# Patient Record
Sex: FEMALE | Race: WHITE | NOT HISPANIC OR LATINO | Employment: UNEMPLOYED | URBAN - NONMETROPOLITAN AREA
[De-identification: names, ages, dates, MRNs, and addresses within clinical notes are randomized per-mention and may not be internally consistent; named-entity substitution may affect disease eponyms.]

---

## 2018-11-01 ENCOUNTER — OFFICE VISIT (OUTPATIENT)
Dept: FAMILY MEDICINE | Facility: OTHER | Age: 23
End: 2018-11-01

## 2018-11-01 ENCOUNTER — APPOINTMENT (OUTPATIENT)
Dept: OCCUPATIONAL MEDICINE | Facility: OTHER | Age: 23
End: 2018-11-01

## 2018-11-01 DIAGNOSIS — Z02.1 PRE-EMPLOYMENT EXAMINATION: Primary | ICD-10-CM

## 2018-11-01 PROCEDURE — 99499 UNLISTED E&M SERVICE: CPT

## 2018-11-01 NOTE — MR AVS SNAPSHOT
After Visit Summary   11/1/2018    Ambreen Flores    MRN: [id number]           Patient Information     Date Of Birth          1995        Visit Information        Provider Department      11/1/2018 10:00 AM Larry Rooney MD [facility]aut[facility]        Today's Diagnoses     Pre-employment examination    -  1       Follow-ups after your visit        Who to contact     If you have questions or need follow up information about today's clinic visit or your schedule please contact [facility] directly at [phone].  Normal or non-critical lab and imaging results will be communicated to you by MyChart, letter or phone within 4 business days after the clinic has received the results. If you do not hear from us within 7 days, please contact the clinic through MyChart or phone. If you have a critical or abnormal lab result, we will notify you by phone as soon as possible.  Submit refill requests through [email or web address] or call your pharmacy and they will forward the refill request to us. Please allow 3 business days for your refill to be completed.          Additional Information About Your Visit        Care EveryWhere ID     This is your Care EveryWhere ID. This could be used by other organizations to access your [facility] medical records  [id number]         Blood Pressure from Last 3 Encounters:   No data found for BP    Weight from Last 3 Encounters:   No data found for Wt              Today, you had the following     No orders found for display       Primary Care Provider    None Specified       No primary provider on file.        Equal Access to Services     [facility] ELIANA : Hadii omega noriegao Sojose, waaxda luqadaha, qaybta kaalmada anastasiya, bee travis. So [facility] [phone].    ATENCIÓN: Si habla español, tiene a red disposición servicios gratuitos de asistencia lingüística. Llame al [phone].    We comply with applicable federal  civil rights laws and [place] laws. We do not discriminate on the basis of race, color, national origin, age, disability, sex, sexual orientation, or gender identity.            Thank you!     Thank you for choosing [facility]  for your care. Our goal is always to provide you with excellent care. Hearing back from our patients is one way we can continue to improve our services. Please take a few minutes to complete the written survey that you may receive in the mail after your visit with us. Thank you!             Your Updated Medication List - Protect others around you: Learn how to safely use, store and throw away your medicines at www.disposemymeds.org.      Notice  As of 11/1/2018 11:29 AM    You have not been prescribed any medications.

## 2019-01-14 ENCOUNTER — OFFICE VISIT (OUTPATIENT)
Dept: CHIROPRACTIC MEDICINE | Facility: OTHER | Age: 24
End: 2019-01-14
Attending: CHIROPRACTOR
Payer: OTHER GOVERNMENT

## 2019-01-14 DIAGNOSIS — M99.01 SEGMENTAL AND SOMATIC DYSFUNCTION OF CERVICAL REGION: ICD-10-CM

## 2019-01-14 DIAGNOSIS — M54.50 ACUTE BILATERAL LOW BACK PAIN WITHOUT SCIATICA: ICD-10-CM

## 2019-01-14 DIAGNOSIS — M99.02 SEGMENTAL AND SOMATIC DYSFUNCTION OF THORACIC REGION: ICD-10-CM

## 2019-01-14 DIAGNOSIS — M99.03 SEGMENTAL AND SOMATIC DYSFUNCTION OF LUMBAR REGION: Primary | ICD-10-CM

## 2019-01-14 PROCEDURE — 98941 CHIROPRACT MANJ 3-4 REGIONS: CPT | Mod: AT | Performed by: CHIROPRACTOR

## 2019-01-14 NOTE — PROGRESS NOTES
Subjective Finding:    Chief compalint: Patient presents with:  Back Pain: from lifting  Neck Pain  , Pain Scale: 5/10, Intensity: sharp, Duration: 3 weeks, Radiating: no.    Date of injury:     Activities that the pain restricts:   Home/household/hobbies/social activities: yes.  Work duties: yes.  Sleep: no.  Makes symptoms better: rest.  Makes symptoms worse: lumbar extension and lumbar flexion.  Have you seen anyone else for the symptoms? No.  Work related: no.  Automobile related injury: no.    Objective and Assessment:    Posture Analysis:   High shoulder: .  Head tilt: .  High iliac crest: .  Head carriage: neutral.  Thoracic Kyphosis: neutral.  Lumbar Lordosis: forward.    Lumbar Range of Motion: extension decreased.  Cervical Range of Motion: .  Thoracic Range of Motion: .  Extremity Range of Motion: .    Palpation:   Quad lumb: bilateral, referred pain: no    Segmental dysfunction pre-treatment and treatment area: C4, T4, T6, L4 and L5.    Assessment post-treatment:  Cervical: ROM increased.  Thoracic: ROM increased.  Lumbar: ROM increased.    Comments: scoliotic curve in T spine.      Complicating Factors: structural abnormalities.    Procedure(s):  CMT:  11703 Chiropractic manipulative treatment 3-4 regions performed   Cervical: Diversified, See above for level, Supine, Thoracic: Diversified, See above for level, Prone and Lumbar: Diversified, See above for level, Side posture    Modalities:  None performed this visit    Therapeutic procedures:  None    Plan:  Treatment plan: 2 times per week for 1 weeks.  Instructed patient: stretch as instructed at visit.  Short term goals: increase ROM.  Long term goals: restore normal function.  Prognosis: good.

## 2019-01-28 ENCOUNTER — OFFICE VISIT (OUTPATIENT)
Dept: CHIROPRACTIC MEDICINE | Facility: OTHER | Age: 24
End: 2019-01-28
Attending: CHIROPRACTOR
Payer: OTHER GOVERNMENT

## 2019-01-28 DIAGNOSIS — M99.03 SEGMENTAL AND SOMATIC DYSFUNCTION OF LUMBAR REGION: Primary | ICD-10-CM

## 2019-01-28 DIAGNOSIS — M99.01 SEGMENTAL AND SOMATIC DYSFUNCTION OF CERVICAL REGION: ICD-10-CM

## 2019-01-28 DIAGNOSIS — M99.02 SEGMENTAL AND SOMATIC DYSFUNCTION OF THORACIC REGION: ICD-10-CM

## 2019-01-28 DIAGNOSIS — M54.50 ACUTE BILATERAL LOW BACK PAIN WITHOUT SCIATICA: ICD-10-CM

## 2019-01-28 PROCEDURE — 98941 CHIROPRACT MANJ 3-4 REGIONS: CPT | Mod: AT | Performed by: CHIROPRACTOR

## 2019-01-28 NOTE — PROGRESS NOTES
Subjective Finding:    Chief compalint: Patient presents with:  Back Pain  , Pain Scale: 5/10, Intensity: sharp, Duration: 3 weeks, Radiating: no.    Date of injury:     Activities that the pain restricts:   Home/household/hobbies/social activities: yes.  Work duties: yes.  Sleep: no.  Makes symptoms better: rest.  Makes symptoms worse: lumbar extension and lumbar flexion.  Have you seen anyone else for the symptoms? No.  Work related: no.  Automobile related injury: no.    Objective and Assessment:    Posture Analysis:   High shoulder: .  Head tilt: .  High iliac crest: .  Head carriage: neutral.  Thoracic Kyphosis: neutral.  Lumbar Lordosis: forward.    Lumbar Range of Motion: extension decreased.  Cervical Range of Motion: .  Thoracic Range of Motion: .  Extremity Range of Motion: .    Palpation:   Quad lumb: bilateral, referred pain: no    Segmental dysfunction pre-treatment and treatment area: C4, T4, T6, L4 and L5.    Assessment post-treatment:  Cervical: ROM increased.  Thoracic: ROM increased.  Lumbar: ROM increased.    Comments: scoliotic curve in T spine.      Complicating Factors: structural abnormalities.    Procedure(s):  CMT:  83610 Chiropractic manipulative treatment 3-4 regions performed   Cervical: Diversified, See above for level, Supine, Thoracic: Diversified, See above for level, Prone and Lumbar: Diversified, See above for level, Side posture    Modalities:  None performed this visit    Therapeutic procedures:  None    Plan:  Treatment plan: 2 times per week for 1 weeks.  Instructed patient: stretch as instructed at visit.  Short term goals: increase ROM.  Long term goals: restore normal function.  Prognosis: good.

## 2019-02-13 ENCOUNTER — HOSPITAL ENCOUNTER (OUTPATIENT)
Dept: ULTRASOUND IMAGING | Facility: HOSPITAL | Age: 24
End: 2019-02-13
Attending: FAMILY MEDICINE
Payer: OTHER GOVERNMENT

## 2019-02-13 PROCEDURE — 76830 TRANSVAGINAL US NON-OB: CPT | Mod: TC

## 2019-02-13 PROCEDURE — 76705 ECHO EXAM OF ABDOMEN: CPT | Mod: TC

## 2019-02-15 ENCOUNTER — HEALTH MAINTENANCE LETTER (OUTPATIENT)
Age: 24
End: 2019-02-15

## 2020-01-01 ENCOUNTER — HOSPITAL ENCOUNTER (EMERGENCY)
Facility: HOSPITAL | Age: 25
Discharge: HOME | End: 2020-01-01
Attending: NURSE PRACTITIONER | Admitting: NURSE PRACTITIONER
Payer: OTHER GOVERNMENT

## 2020-01-01 VITALS
TEMPERATURE: 97.6 F | DIASTOLIC BLOOD PRESSURE: 85 MMHG | HEART RATE: 110 BPM | OXYGEN SATURATION: 98 % | SYSTOLIC BLOOD PRESSURE: 128 MMHG

## 2020-01-01 DIAGNOSIS — S61.419A HAND LACERATION: Primary | ICD-10-CM

## 2020-01-01 PROCEDURE — 12002 RPR S/N/AX/GEN/TRNK2.6-7.5CM: CPT | Mod: Z6 | Performed by: NURSE PRACTITIONER

## 2020-01-01 PROCEDURE — 12002 RPR S/N/AX/GEN/TRNK2.6-7.5CM: CPT

## 2020-01-01 PROCEDURE — 40000268 ZZH STATISTIC NO CHARGES

## 2020-01-01 RX ORDER — LEVONORGESTREL/ETHIN.ESTRADIOL 0.1-0.02MG
TABLET ORAL
Status: ON HOLD | COMMUNITY
Start: 2019-03-29 | End: 2021-01-21

## 2020-01-01 RX ORDER — SULFAMETHOXAZOLE AND TRIMETHOPRIM 400; 80 MG/1; MG/1
1 TABLET ORAL 2 TIMES DAILY
Status: ON HOLD | COMMUNITY
End: 2021-01-21

## 2020-01-01 RX ORDER — ARIPIPRAZOLE 5 MG/1
5 TABLET ORAL DAILY
Status: ON HOLD | COMMUNITY
End: 2021-01-21

## 2020-01-01 ASSESSMENT — ENCOUNTER SYMPTOMS: WOUND: 1

## 2020-01-01 NOTE — ED PROVIDER NOTES
History     Chief Complaint   Patient presents with     Laceration     HPI  Ambreen Flores is a 24 year old female who presents to  for a laceration to right hand and index finger. She was in a smoothie when she put her hand on top of the  not knowing it was on and accidentally got cut. She can't move index finger all the way. Last tetanus was 2018.      Allergies:  No Known Allergies    Problem List:    There are no active problems to display for this patient.       Past Medical History:    History reviewed. No pertinent past medical history.    Past Surgical History:    History reviewed. No pertinent surgical history.    Family History:    No family history on file.    Social History:  Marital Status:  Single [1]  Social History     Tobacco Use     Smoking status: None   Substance Use Topics     Alcohol use: None     Drug use: None        Medications:    ARIPiprazole (ABILIFY) 5 MG tablet  levonorgestrel-ethinyl estradiol (AVIANE/ALESSE/LESSINA) 0.1-20 MG-MCG tablet  sulfamethoxazole-trimethoprim (BACTRIM/SEPTRA) 400-80 MG tablet          Review of Systems   Skin: Positive for wound.   All other systems reviewed and are negative.      Physical Exam   BP: 128/85  Pulse: 110  Temp: 97.6  F (36.4  C)  SpO2: 98 %      Physical Exam  Vitals signs and nursing note reviewed.   Constitutional:       Appearance: She is not ill-appearing or toxic-appearing.   Cardiovascular:      Rate and Rhythm: Normal rate.   Pulmonary:      Effort: Pulmonary effort is normal.   Musculoskeletal:      Right hand: She exhibits laceration. She exhibits normal range of motion and normal capillary refill. Normal sensation noted. Normal strength noted.        Hands:       Comments: 1 inch laceration to right palmar hand radial side.  Half an inch laceration to right middle phalanx of pointer finger.   Skin:     Capillary Refill: Capillary refill takes less than 2 seconds.   Neurological:      Mental Status: She is alert and oriented  to person, place, and time.         ED Course        [facility]    -Laceration Repair  Date/Time: 1/1/2020 3:00 PM  Performed by: Mary Mccloud CNP  Authorized by: Mary Mccloud CNP     UNIVERSAL PROTOCOL   Site Marked: NA  Prior Images Obtained and Reviewed:  NA  Required items: Required blood products, implants, devices and special equipment available    Patient identity confirmed:  Verbally with patient and arm band  NA - No sedation, light sedation, or local anesthesia  Confirmation Checklist:  Patient's identity using two indicators, relevant allergies, procedure was appropriate and matched the consent or emergent situation and correct equipment/implants were available  Universal Protocol: the Joint Commission Universal Protocol was followed    Preparation: Patient was prepped and draped in usual sterile fashion          ANESTHESIA (see MAR for exact dosages):     Anesthesia method:  Local infiltration    Local anesthetic:  Lidocaine 1% w/o epi  LACERATION DETAILS     Location:  Hand    Hand location:  R palm    Length (cm):  2.5    Depth (mm):  10    REPAIR TYPE:     Repair type:  Simple      EXPLORATION:     Hemostasis achieved with:  Direct pressure    Wound exploration: entire depth of wound probed and visualized      Wound extent: fascia violated      Wound extent: no nerve damage, no tendon damage and no underlying fracture      TREATMENT:     Area cleansed with:  Saline and Hibiclens    Amount of cleaning:  Standard    Irrigation solution:  Sterile saline    Irrigation volume:  30    Irrigation method:  Pressure wash    Visualized foreign bodies/material removed: no      SKIN REPAIR     Repair method:  Sutures    Suture size:  4-0    Suture technique:  Simple interrupted    Number of sutures:  7    APPROXIMATION     Approximation:  Close    POST-PROCEDURE DETAILS     Dressing:  Open (no dressing)      PROCEDURE   Patient Tolerance:  Patient tolerated the procedure well with no immediate  complications    [facility]    -Laceration Repair  Date/Time: 1/1/2020 3:20 PM  Performed by: Mary Mccloud CNP  Authorized by: Mary Mccloud CNP       ANESTHESIA (see MAR for exact dosages):     Anesthesia method:  Local infiltration    Local anesthetic:  Lidocaine 1% w/o epi  LACERATION DETAILS     Location:  Finger    Finger location:  R index finger    Length (cm):  1.2    Depth (mm):  3    REPAIR TYPE:     Repair type:  Simple      EXPLORATION:     Hemostasis achieved with:  Direct pressure    Wound exploration: wound explored through full range of motion      Wound extent: no foreign body, no nerve damage, no tendon damage and no underlying fracture      TREATMENT:     Area cleansed with:  Saline and Hibiclens    Irrigation solution:  Sterile saline    Irrigation volume:  20    Irrigation method:  Pressure wash    Visualized foreign bodies/material removed: no      SKIN REPAIR     Repair method:  Sutures    Suture size:  4-0    Suture technique:  Simple interrupted    Number of sutures:  2    APPROXIMATION     Approximation:  Close  PROCEDURE   Patient Tolerance:  Patient tolerated the procedure well with no immediate complications               No results found for this or any previous visit (from the past 24 hour(s)).    Medications - No data to display    Assessments & Plan (with Medical Decision Making)   Hand laceration:  Pleasant 24-year-old female that presented to urgent care for a laceration to right hand after accidentally getting caught on a .  1 inch laceration to right palmar hand on the radial side and half an inch laceration to right middle phalanx of index finger.  Full range of motion to right index finger.  Full sensation to right index finger and hand.  Tetanus up-to-date as of 2018.  Plan:  Laceration repair done (see procedure note above).  Patient tolerated well.  Keep the laceration clean and dry.  Can apply triple antibiotic to the wound. Go to your doctor in 7  to 10 days to get stitches removed.   Return to emergency department for worsening or concerning symptoms.    I have reviewed the nursing notes.    I have reviewed the findings, diagnosis, plan and need for follow up with the patient.    Final diagnoses:   Hand laceration       1/1/2020   [facility]     Mary Mccloud, CNP  01/01/20 1600

## 2020-01-01 NOTE — DISCHARGE INSTRUCTIONS
Keep the laceration clean and dry.  Can apply triple antibiotic to the wound.    Go to your doctor in 7 to 10 days to get stitches removed.    Return to emergency department for worsening or concerning symptoms.

## 2020-01-01 NOTE — ED AVS SNAPSHOT
[facility]  [address] [address]  Phone:  [phone]                                    Ambreen Flores   MRN: [id number]    Department:  [facility]   Date of Visit:  1/1/2020           After Visit Summary Signature Page    I have received my discharge instructions, and my questions have been answered. I have discussed any challenges I see with this plan with the nurse or doctor.    ..........................................................................................................................................  Patient/Patient Representative Signature      ..........................................................................................................................................  Patient Representative Print Name and Relationship to Patient    ..................................................               ................................................  Date                                   Time    ..........................................................................................................................................  Reviewed by Signature/Title    ...................................................              ..............................................  Date                                               Time          22EPIC Rev 08/18

## 2020-03-11 ENCOUNTER — HEALTH MAINTENANCE LETTER (OUTPATIENT)
Age: 25
End: 2020-03-11

## 2020-08-10 ENCOUNTER — HOSPITAL ENCOUNTER (OUTPATIENT)
Dept: ULTRASOUND IMAGING | Facility: HOSPITAL | Age: 25
Discharge: HOME | End: 2020-08-10
Attending: FAMILY MEDICINE | Admitting: FAMILY MEDICINE
Payer: OTHER GOVERNMENT

## 2020-08-10 DIAGNOSIS — Z34.91 NORMAL PREGNANCY IN FIRST TRIMESTER: ICD-10-CM

## 2020-08-10 PROCEDURE — 76801 OB US < 14 WKS SINGLE FETUS: CPT | Mod: TC

## 2020-08-31 ENCOUNTER — TRANSFERRED RECORDS (OUTPATIENT)
Dept: HEALTH INFORMATION MANAGEMENT | Facility: HOSPITAL | Age: 25
End: 2020-08-31

## 2020-08-31 LAB — PAP-ABSTRACT: NORMAL

## 2020-10-02 ENCOUNTER — NURSE TRIAGE (OUTPATIENT)
Dept: FAMILY MEDICINE | Facility: OTHER | Age: 25
End: 2020-10-02

## 2020-10-02 ENCOUNTER — OFFICE VISIT (OUTPATIENT)
Dept: FAMILY MEDICINE | Facility: OTHER | Age: 25
End: 2020-10-02
Attending: FAMILY MEDICINE
Payer: OTHER GOVERNMENT

## 2020-10-02 DIAGNOSIS — R05.9 COUGH: ICD-10-CM

## 2020-10-02 DIAGNOSIS — R07.0 THROAT PAIN: Primary | ICD-10-CM

## 2020-10-02 DIAGNOSIS — R51.9 HEADACHE: ICD-10-CM

## 2020-10-02 PROCEDURE — U0003 INFECTIOUS AGENT DETECTION BY NUCLEIC ACID (DNA OR RNA); SEVERE ACUTE RESPIRATORY SYNDROME CORONAVIRUS 2 (SARS-COV-2) (CORONAVIRUS DISEASE [COVID-19]), AMPLIFIED PROBE TECHNIQUE, MAKING USE OF HIGH THROUGHPUT TECHNOLOGIES AS DESCRIBED BY CMS-2020-01-R: HCPCS | Performed by: FAMILY MEDICINE

## 2020-10-02 NOTE — TELEPHONE ENCOUNTER
"    Reason for Disposition    [1] COVID-19 infection suspected by caller or triager AND [2] mild symptoms (cough, fever, or others) AND [3] no complications or SOB    Answer Assessment - Initial Assessment Questions  1. COVID-19 DIAGNOSIS: \"Who made your Coronavirus (COVID-19) diagnosis?\" \"Was it confirmed by a positive lab test?\" If not diagnosed by a HCP, ask \"Are there lots of cases (community spread) where you live?\" (See public health department website, if unsure)      Yes possibly co-workers daughter is positive.  2. ONSET: \"When did the COVID-19 symptoms start?\"       yesterday  3. WORST SYMPTOM: \"What is your worst symptom?\" (e.g., cough, fever, shortness of breath, muscle aches)      Cough,   4. COUGH: \"Do you have a cough?\" If so, ask: \"How bad is the cough?\"        cough  5. FEVER: \"Do you have a fever?\" If so, ask: \"What is your temperature, how was it measured, and when did it start?\"      no  6. RESPIRATORY STATUS: \"Describe your breathing?\" (e.g., shortness of breath, wheezing, unable to speak)       no  7. BETTER-SAME-WORSE: \"Are you getting better, staying the same or getting worse compared to yesterday?\"  If getting worse, ask, \"In what way?\"      worse  8. HIGH RISK DISEASE: \"Do you have any chronic medical problems?\" (e.g., asthma, heart or lung disease, weak immune system, etc.)      pregnant  9. PREGNANCY: \"Is there any chance you are pregnant?\" \"When was your last menstrual period?\"      pregnant  10. OTHER SYMPTOMS: \"Do you have any other symptoms?\"  (e.g., chills, fatigue, headache, loss of smell or taste, muscle pain, sore throat)        Headache, sore throat    Protocols used: CORONAVIRUS (COVID-19) DIAGNOSED OR [id number] 8.4.20      "

## 2020-10-05 LAB
SARS-COV-2 RNA SPEC QL NAA+PROBE: NOT DETECTED
SPECIMEN SOURCE: NORMAL

## 2020-10-13 ENCOUNTER — MEDICAL CORRESPONDENCE (OUTPATIENT)
Dept: ULTRASOUND IMAGING | Facility: HOSPITAL | Age: 25
End: 2020-10-13

## 2020-10-26 ENCOUNTER — NURSE TRIAGE (OUTPATIENT)
Dept: FAMILY MEDICINE | Facility: OTHER | Age: 25
End: 2020-10-26

## 2020-10-26 DIAGNOSIS — Z20.822 COVID-19 RULED OUT: Primary | ICD-10-CM

## 2020-10-26 NOTE — TELEPHONE ENCOUNTER
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 Patient is asymptomatic at the present time.   See protocol for details.     Covid testing scheduled:    Next 5 appointments (look out 90 days)    Oct 27, 2020 10:45 AM  (Arrive by 10:30 AM)  SHORT with [facility] [place] [facility] [place] ([facility] [place] [facility] [address]  [place] [address]  [phone]          14 day quarantine instructions have been provided.     Reason for Disposition    [1] COVID-19 EXPOSURE (Close Contact) AND [2] within last 14 days BUT [3] NO symptoms    Additional Information    Negative: COVID-19 has been diagnosed by a healthcare provider (HCP)    Negative: COVID-19 lab test positive    Negative: [1] Symptoms of COVID-19 (e.g., cough, fever, SOB, or others) AND [2] lives in an area with community spread    Negative: [1] Symptoms of COVID-19 (e.g., cough, fever, SOB, or others) AND [2] within 14 days of EXPOSURE (close contact) with diagnosed or suspected COVID-19 patient    Negative: [1] Symptoms of COVID-19 (e.g., cough, fever, SOB, or others) AND [2] within 14 days of travel from high-risk area for COVID-19 community spread (identified by CDC)    Negative: [1] Difficulty breathing (shortness of breath) occurs AND [2] onset > 14 days after COVID-19 EXPOSURE (Close Contact) AND [3] no community spread where patient lives    Negative: [1] Dry cough occurs AND [2] onset > 14 days after COVID-19 EXPOSURE AND [3] no community spread where patient lives    Negative: [1] Wet cough (i.e., white-yellow, yellow, green, or hector colored sputum) AND [2] onset > 14 days after COVID-19 EXPOSURE AND [3] no community spread where patient lives    Negative: [1] Common cold symptoms AND [2] onset > 14 days after COVID-19 EXPOSURE AND [3] no community spread where patient lives    Negative: [1] COVID-19 EXPOSURE (Close Contact) within last 14 days  "AND [2] needs COVID-19 lab test to return to work AND [3] NO symptoms    Negative: [1] COVID-19 EXPOSURE (Close Contact) within last 14 days AND [2] exposed person is a healthcare worker who was NOT using all recommended personal protective equipment (i.e., a respirator-N95 mask, eye protection, gloves, and gown) AND [3] NO symptoms    Negative: [1] COVID-19 EXPOSURE AND [2] 15 or more days ago AND [3] NO symptoms    Negative: [1] Living in area with community spread (identified by local PHD) BUT [2] NO symptoms    Answer Assessment - Initial Assessment Questions  1. CLOSE CONTACT: \"Who is the person with the confirmed or suspected COVID-19 infection that you were exposed to?\"      Pt has not been diagnosed with covid     2. PLACE of CONTACT: \"Where were you when you were exposed to COVID-19?\" (e.g., home, school, medical waiting room; which city?)      [place]    3. 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     4. DURATION of CONTACT: \"How long were you in contact with the COVID-19 patient?\" (e.g., a few seconds, passed by person, a few minutes, live with the patient)      In the same room with the child within 6'    5. DATE of CONTACT: \"When did you have contact with a COVID-19 patient?\" (e.g., how many days ago)      10/23/20    6. TRAVEL: \"Have you traveled out of the country recently?\" If so, \"When and where?\"      * Also ask about out-of-state travel, since the CDC has identified some high-risk cities for community spread in the [place].      * Note: Travel becomes less relevant if there is widespread community transmission where the patient lives.      No     7. COMMUNITY SPREAD: \"Are there lots of cases of COVID-19 (community spread) where you live?\" (See public health department website, if unsure)        Yes, cases are increasing    8. SYMPTOMS: \"Do you have any symptoms?\" " "(e.g., fever, cough, breathing difficulty)      Slight mild headache and stuffy nose.     9. PREGNANCY OR POSTPARTUM: \"Is there any chance you are pregnant?\" \"When was your last menstrual period?\" \"Did you deliver in the last 2 weeks?\"      Yes, 19 weeks    10. HIGH RISK: \"Do you have any heart or lung problems? Do you have a weak immune system?\" (e.g., CHF, COPD, asthma, HIV positive, chemotherapy, renal failure, diabetes mellitus, sickle cell anemia)        Pregnant    Protocols used: CORONAVIRUS (COVID-19) EXPOSURE-A- 8.4.20      "

## 2020-10-27 ENCOUNTER — OFFICE VISIT (OUTPATIENT)
Dept: FAMILY MEDICINE | Facility: OTHER | Age: 25
End: 2020-10-27
Attending: NURSE PRACTITIONER
Payer: OTHER GOVERNMENT

## 2020-10-27 DIAGNOSIS — Z20.822 COVID-19 RULED OUT: ICD-10-CM

## 2020-10-27 PROCEDURE — U0003 INFECTIOUS AGENT DETECTION BY NUCLEIC ACID (DNA OR RNA); SEVERE ACUTE RESPIRATORY SYNDROME CORONAVIRUS 2 (SARS-COV-2) (CORONAVIRUS DISEASE [COVID-19]), AMPLIFIED PROBE TECHNIQUE, MAKING USE OF HIGH THROUGHPUT TECHNOLOGIES AS DESCRIBED BY CMS-2020-01-R: HCPCS | Performed by: FAMILY MEDICINE

## 2020-10-28 LAB
SARS-COV-2 RNA SPEC QL NAA+PROBE: NOT DETECTED
SPECIMEN SOURCE: NORMAL

## 2020-11-02 ENCOUNTER — HOSPITAL ENCOUNTER (OUTPATIENT)
Dept: ULTRASOUND IMAGING | Facility: HOSPITAL | Age: 25
Discharge: HOME | End: 2020-11-02
Attending: FAMILY MEDICINE | Admitting: FAMILY MEDICINE
Payer: OTHER GOVERNMENT

## 2020-11-02 DIAGNOSIS — Z34.02 ENCOUNTER FOR SUPERVISION OF NORMAL FIRST PREGNANCY IN SECOND TRIMESTER: ICD-10-CM

## 2020-11-02 PROCEDURE — 76805 OB US >/= 14 WKS SNGL FETUS: CPT

## 2020-11-11 ENCOUNTER — OFFICE VISIT (OUTPATIENT)
Dept: CHIROPRACTIC MEDICINE | Facility: OTHER | Age: 25
End: 2020-11-11
Attending: CHIROPRACTOR
Payer: OTHER GOVERNMENT

## 2020-11-11 DIAGNOSIS — M99.02 SEGMENTAL AND SOMATIC DYSFUNCTION OF THORACIC REGION: ICD-10-CM

## 2020-11-11 DIAGNOSIS — M99.03 SEGMENTAL AND SOMATIC DYSFUNCTION OF LUMBAR REGION: Primary | ICD-10-CM

## 2020-11-11 DIAGNOSIS — M54.50 ACUTE BILATERAL LOW BACK PAIN WITHOUT SCIATICA: ICD-10-CM

## 2020-11-11 PROCEDURE — 98941 CHIROPRACT MANJ 3-4 REGIONS: CPT | Mod: AT | Performed by: CHIROPRACTOR

## 2020-11-12 NOTE — PROGRESS NOTES
Subjective Finding:    Chief compalint: Patient presents with:  Back Pain: with pregnancy  , Pain Scale: 5/10, Intensity: sharp, Duration: 3 weeks, Radiating: no.    Date of injury:     Activities that the pain restricts:   Home/household/hobbies/social activities: yes.  Work duties: yes.  Sleep: no.  Makes symptoms better: rest.  Makes symptoms worse: lumbar extension and lumbar flexion.  Have you seen anyone else for the symptoms? No.  Work related: no.  Automobile related injury: no.    Objective and Assessment:    Posture Analysis:   High shoulder: .  Head tilt: .  High iliac crest: .  Head carriage: neutral.  Thoracic Kyphosis: neutral.  Lumbar Lordosis: forward.    Lumbar Range of Motion: extension decreased.  Cervical Range of Motion: .  Thoracic Range of Motion: .  Extremity Range of Motion: .    Palpation:   Quad lumb: bilateral, referred pain: no    Segmental dysfunction pre-treatment and treatment area: C4, T4, T6, L4 and L5.    Assessment post-treatment:  Cervical: ROM increased.  Thoracic: ROM increased.  Lumbar: ROM increased.    Comments: scoliotic curve in T spine.      Complicating Factors: structural abnormalities.    Procedure(s):  CMT:  86155 Chiropractic manipulative treatment 3-4 regions performed   Cervical: Diversified, See above for level, Supine, Thoracic: Diversified, See above for level, Prone and Lumbar: Diversified, See above for level, Side posture    Modalities:  None performed this visit    Therapeutic procedures:  None    Plan:  Treatment plan: 2 times per week for 1 weeks.  Instructed patient: stretch as instructed at visit.  Short term goals: increase ROM.  Long term goals: restore normal function.  Prognosis: good.

## 2020-11-19 ENCOUNTER — MEDICAL CORRESPONDENCE (OUTPATIENT)
Dept: ULTRASOUND IMAGING | Facility: HOSPITAL | Age: 25
End: 2020-11-19

## 2020-11-23 ENCOUNTER — HOSPITAL ENCOUNTER (OUTPATIENT)
Dept: ULTRASOUND IMAGING | Facility: HOSPITAL | Age: 25
Discharge: HOME | End: 2020-11-23
Attending: FAMILY MEDICINE | Admitting: FAMILY MEDICINE
Payer: OTHER GOVERNMENT

## 2020-11-23 DIAGNOSIS — Z34.02 ENCOUNTER FOR SUPERVISION OF NORMAL FIRST PREGNANCY IN SECOND TRIMESTER: ICD-10-CM

## 2020-11-23 PROCEDURE — 76816 OB US FOLLOW-UP PER FETUS: CPT

## 2020-12-07 ENCOUNTER — OFFICE VISIT (OUTPATIENT)
Dept: CHIROPRACTIC MEDICINE | Facility: OTHER | Age: 25
End: 2020-12-07
Attending: CHIROPRACTOR
Payer: OTHER GOVERNMENT

## 2020-12-07 DIAGNOSIS — M99.02 SEGMENTAL AND SOMATIC DYSFUNCTION OF THORACIC REGION: Primary | ICD-10-CM

## 2020-12-07 DIAGNOSIS — M99.01 SEGMENTAL AND SOMATIC DYSFUNCTION OF CERVICAL REGION: ICD-10-CM

## 2020-12-07 DIAGNOSIS — M54.50 ACUTE BILATERAL LOW BACK PAIN WITHOUT SCIATICA: ICD-10-CM

## 2020-12-07 DIAGNOSIS — M99.03 SEGMENTAL AND SOMATIC DYSFUNCTION OF LUMBAR REGION: ICD-10-CM

## 2020-12-07 PROCEDURE — 98941 CHIROPRACT MANJ 3-4 REGIONS: CPT | Mod: AT | Performed by: CHIROPRACTOR

## 2020-12-08 NOTE — PROGRESS NOTES
Subjective Finding:    Chief compalint: Patient presents with:  Back Pain  , Pain Scale: 5/10, Intensity: sharp, Duration: 3 weeks, Radiating: no.    Date of injury:     Activities that the pain restricts:   Home/household/hobbies/social activities: yes.  Work duties: yes.  Sleep: no.  Makes symptoms better: rest.  Makes symptoms worse: lumbar extension and lumbar flexion.  Have you seen anyone else for the symptoms? No.  Work related: no.  Automobile related injury: no.    Objective and Assessment:    Posture Analysis:   High shoulder: .  Head tilt: .  High iliac crest: .  Head carriage: neutral.  Thoracic Kyphosis: neutral.  Lumbar Lordosis: forward.    Lumbar Range of Motion: extension decreased.  Cervical Range of Motion: .  Thoracic Range of Motion: .  Extremity Range of Motion: .    Palpation:   Quad lumb: bilateral, referred pain: no    Segmental dysfunction pre-treatment and treatment area: C4, T4, T6, L4 and L5.    Assessment post-treatment:  Cervical: ROM increased.  Thoracic: ROM increased.  Lumbar: ROM increased.    Comments: scoliotic curve in T spine.      Complicating Factors: structural abnormalities.    Procedure(s):  CMT:  05315 Chiropractic manipulative treatment 3-4 regions performed   Cervical: Diversified, See above for level, Supine, Thoracic: Diversified, See above for level, Prone and Lumbar: Diversified, See above for level, Side posture    Modalities:  None performed this visit    Therapeutic procedures:  None    Plan:  Treatment plan: 2 times per week for 1 weeks.  Instructed patient: stretch as instructed at visit.  Short term goals: increase ROM.  Long term goals: restore normal function.  Prognosis: good.

## 2021-01-03 ENCOUNTER — HEALTH MAINTENANCE LETTER (OUTPATIENT)
Age: 26
End: 2021-01-03

## 2021-01-20 ENCOUNTER — OFFICE VISIT (OUTPATIENT)
Dept: CHIROPRACTIC MEDICINE | Facility: OTHER | Age: 26
End: 2021-01-20
Attending: CHIROPRACTOR
Payer: OTHER GOVERNMENT

## 2021-01-20 DIAGNOSIS — M99.01 SEGMENTAL AND SOMATIC DYSFUNCTION OF CERVICAL REGION: ICD-10-CM

## 2021-01-20 DIAGNOSIS — M99.02 SEGMENTAL AND SOMATIC DYSFUNCTION OF THORACIC REGION: ICD-10-CM

## 2021-01-20 DIAGNOSIS — M99.03 SEGMENTAL AND SOMATIC DYSFUNCTION OF LUMBAR REGION: Primary | ICD-10-CM

## 2021-01-20 DIAGNOSIS — M54.50 ACUTE BILATERAL LOW BACK PAIN WITHOUT SCIATICA: ICD-10-CM

## 2021-01-20 PROCEDURE — 98941 CHIROPRACT MANJ 3-4 REGIONS: CPT | Mod: AT | Performed by: CHIROPRACTOR

## 2021-01-21 ENCOUNTER — HOSPITAL ENCOUNTER (OUTPATIENT)
Facility: HOSPITAL | Age: 26
Discharge: HOME | End: 2021-01-21
Attending: FAMILY MEDICINE | Admitting: FAMILY MEDICINE
Payer: OTHER GOVERNMENT

## 2021-01-21 ENCOUNTER — HOSPITAL ENCOUNTER (OUTPATIENT)
Facility: HOSPITAL | Age: 26
End: 2021-01-21
Admitting: FAMILY MEDICINE
Payer: OTHER GOVERNMENT

## 2021-01-21 VITALS
WEIGHT: 165 LBS | HEIGHT: 66 IN | OXYGEN SATURATION: 97 % | RESPIRATION RATE: 20 BRPM | TEMPERATURE: 98 F | DIASTOLIC BLOOD PRESSURE: 74 MMHG | SYSTOLIC BLOOD PRESSURE: 129 MMHG | BODY MASS INDEX: 26.52 KG/M2 | HEART RATE: 108 BPM

## 2021-01-21 PROBLEM — Z36.89 ENCOUNTER FOR TRIAGE IN PREGNANT PATIENT: Status: ACTIVE | Noted: 2021-01-21

## 2021-01-21 RX ORDER — PRENATAL VIT/IRON FUM/FOLIC AC 27MG-0.8MG
1 TABLET ORAL DAILY
COMMUNITY

## 2021-01-21 ASSESSMENT — MIFFLIN-ST. JEOR: SCORE: 1510.19

## 2021-01-21 NOTE — PLAN OF CARE
OB Triage Note  Ambreen Flores  MRN: [id number]  Gestational Age: 31w5d      Ambreen Flores presents for fall on ice. Pt reports landing on right side and hitting part of belly. Denies leaking of fluid, vaginal bleeding or contractions. Reports babe is moving normally.      Dr. Pratt notified of arrival and condition.  Oriented patient to surroundings. Call light within reach.     FHT: 135  NST Start Time:1728  NST Stop Time:1820  NST: Reactive .  Uterine Assessment:Contractions: irregular      Plan:  -Initial NST, then fetal/uterine monitoring per MD/patient plan.  -Nursing education on placenta abruption,  labor, and when to return to the hospital provided.

## 2021-01-21 NOTE — PROGRESS NOTES
Subjective Finding:    Chief compalint: Patient presents with:  Back Pain  Neck Pain  , Pain Scale: 5/10, Intensity: sharp, Duration: 3 weeks, Radiating: no.    Date of injury:     Activities that the pain restricts:   Home/household/hobbies/social activities: yes.  Work duties: yes.  Sleep: no.  Makes symptoms better: rest.  Makes symptoms worse: lumbar extension and lumbar flexion.  Have you seen anyone else for the symptoms? No.  Work related: no.  Automobile related injury: no.    Objective and Assessment:    Posture Analysis:   High shoulder: .  Head tilt: .  High iliac crest: .  Head carriage: neutral.  Thoracic Kyphosis: neutral.  Lumbar Lordosis: forward.    Lumbar Range of Motion: extension decreased.  Cervical Range of Motion: .  Thoracic Range of Motion: .  Extremity Range of Motion: .    Palpation:   Quad lumb: bilateral, referred pain: no    Segmental dysfunction pre-treatment and treatment area: C4, T4, T6, L4 and L5.    Assessment post-treatment:  Cervical: ROM increased.  Thoracic: ROM increased.  Lumbar: ROM increased.    Comments: scoliotic curve in T spine.      Complicating Factors: structural abnormalities.    Procedure(s):  [facility]:  [address] Chiropractic manipulative treatment 3-4 regions performed   Cervical: Diversified, See above for level, Supine, Thoracic: Diversified, See above for level, Prone and Lumbar: Diversified, See above for level, Side posture    Modalities:  None performed this visit    Therapeutic procedures:  None    Plan:  Treatment plan: 2 times per week for 1 weeks.  Instructed patient: stretch as instructed at visit.  Short term goals: increase ROM.  Long term goals: restore normal function.  Prognosis: good.

## 2021-01-22 NOTE — PLAN OF CARE
"Ambreen Flores is a 25 year old  and 31w5d patient came in complaining of Fall (fell on ice and landed on belly 30 min ago)    Patient Active Problem List   Diagnosis     Encounter for triage in pregnant patient       Pt discharged to home at 1825 and encouraged to rest and drink plenty of fluids.  Pt told to call/return if bleeding more than spotting, water breaks, contractions 3-5 minutes apart that she has to breath through.     Nursing education on placenta abruption,  labor and when to return to the hospital provided. Self-management instructions reviewed. AVS given and signed. All questions answered and patient verbalizes understanding.    /74   Pulse 108   Temp 98  F (36.7  C) (Oral)   Resp 20   Ht 1.676 m (5' 6\")   Wt 74.8 kg (165 lb)   SpO2 97%   BMI 26.63 kg/m      Cervical status: not examined  Fetal Assessment: Reactive    Discharge support:  Independent-Alone    OB History    Para Term  AB Living   1 0 0 0 0 0   SAB TAB Ectopic Multiple Live Births   0 0 0 0 0      # Outcome Date GA Lbr Adelfo/2nd Weight Sex Delivery Anes PTL Lv   1 Current                Bonita Song RN    "

## 2021-01-22 NOTE — DISCHARGE INSTRUCTIONS

## 2021-01-25 ENCOUNTER — TRANSFERRED RECORDS (OUTPATIENT)
Dept: HEALTH INFORMATION MANAGEMENT | Facility: CLINIC | Age: 26
End: 2021-01-25

## 2021-01-28 NOTE — PROGRESS NOTES
Fetal Non-Stress Test Results    NST Ordered By: Noah Pratt MD   NST Medical Indication: fall    NST Start & Stop Times  NST Start Time: 1728  NST Stop Time: 1820       NST Results  Fetus A   Baseline Rate: 140  Accelerations: Present  Decelerations: None  Interpretation: reactive

## 2021-02-22 ENCOUNTER — TRANSFERRED RECORDS (OUTPATIENT)
Dept: HEALTH INFORMATION MANAGEMENT | Facility: CLINIC | Age: 26
End: 2021-02-22

## 2021-03-01 ENCOUNTER — TRANSFERRED RECORDS (OUTPATIENT)
Dept: HEALTH INFORMATION MANAGEMENT | Facility: CLINIC | Age: 26
End: 2021-03-01

## 2021-03-08 ENCOUNTER — TRANSFERRED RECORDS (OUTPATIENT)
Dept: HEALTH INFORMATION MANAGEMENT | Facility: CLINIC | Age: 26
End: 2021-03-08

## 2021-03-22 ENCOUNTER — TELEPHONE (OUTPATIENT)
Dept: FAMILY MEDICINE | Facility: OTHER | Age: 26
End: 2021-03-22

## 2021-03-22 DIAGNOSIS — Z20.822 SUSPECTED COVID-19 VIRUS INFECTION: Primary | ICD-10-CM

## 2021-03-26 ENCOUNTER — OFFICE VISIT (OUTPATIENT)
Dept: FAMILY MEDICINE | Facility: OTHER | Age: 26
End: 2021-03-26
Attending: FAMILY MEDICINE
Payer: OTHER GOVERNMENT

## 2021-03-26 DIAGNOSIS — Z20.822 SUSPECTED COVID-19 VIRUS INFECTION: ICD-10-CM

## 2021-03-26 PROCEDURE — U0005 INFEC AGEN DETEC AMPLI PROBE: HCPCS | Performed by: FAMILY MEDICINE

## 2021-03-26 PROCEDURE — U0003 INFECTIOUS AGENT DETECTION BY NUCLEIC ACID (DNA OR RNA); SEVERE ACUTE RESPIRATORY SYNDROME CORONAVIRUS 2 (SARS-COV-2) (CORONAVIRUS DISEASE [COVID-19]), AMPLIFIED PROBE TECHNIQUE, MAKING USE OF HIGH THROUGHPUT TECHNOLOGIES AS DESCRIBED BY CMS-2020-01-R: HCPCS | Performed by: FAMILY MEDICINE

## 2021-03-27 LAB
SARS-COV-2 RNA RESP QL NAA+PROBE: NOT DETECTED
SPECIMEN SOURCE: NORMAL

## 2021-03-29 ENCOUNTER — ANESTHESIA (OUTPATIENT)
Dept: SURGERY | Facility: HOSPITAL | Age: 26
End: 2021-03-29
Payer: OTHER GOVERNMENT

## 2021-03-29 ENCOUNTER — ANESTHESIA EVENT (OUTPATIENT)
Dept: SURGERY | Facility: HOSPITAL | Age: 26
End: 2021-03-29
Payer: OTHER GOVERNMENT

## 2021-03-29 ENCOUNTER — HOSPITAL ENCOUNTER (OUTPATIENT)
Dept: ULTRASOUND IMAGING | Facility: HOSPITAL | Age: 26
Discharge: HOME | End: 2021-03-29
Attending: FAMILY MEDICINE | Admitting: FAMILY MEDICINE
Payer: OTHER GOVERNMENT

## 2021-03-29 ENCOUNTER — APPOINTMENT (OUTPATIENT)
Dept: ULTRASOUND IMAGING | Facility: HOSPITAL | Age: 26
End: 2021-03-29
Attending: OBSTETRICS & GYNECOLOGY
Payer: OTHER GOVERNMENT

## 2021-03-29 ENCOUNTER — HOSPITAL ENCOUNTER (INPATIENT)
Facility: HOSPITAL | Age: 26
LOS: 2 days | Discharge: HOME | End: 2021-03-31
Attending: OBSTETRICS & GYNECOLOGY | Admitting: OBSTETRICS & GYNECOLOGY
Payer: OTHER GOVERNMENT

## 2021-03-29 ENCOUNTER — MEDICAL CORRESPONDENCE (OUTPATIENT)
Dept: ULTRASOUND IMAGING | Facility: HOSPITAL | Age: 26
End: 2021-03-29

## 2021-03-29 ENCOUNTER — PREP FOR PROCEDURE (OUTPATIENT)
Dept: OBGYN | Facility: OTHER | Age: 26
End: 2021-03-29

## 2021-03-29 DIAGNOSIS — Z34.03 ENCOUNTER FOR SUPERVISION OF NORMAL FIRST PREGNANCY IN THIRD TRIMESTER: ICD-10-CM

## 2021-03-29 PROBLEM — Z36.89 ENCOUNTER FOR TRIAGE IN PREGNANT PATIENT: Status: RESOLVED | Noted: 2021-01-21 | Resolved: 2021-03-29

## 2021-03-29 LAB
ABO + RH BLD: NORMAL
ABO + RH BLD: NORMAL
BASOPHILS # BLD AUTO: 0 10E9/L (ref 0–0.2)
BASOPHILS NFR BLD AUTO: 0.2 %
BLD GP AB SCN SERPL QL: NORMAL
BLOOD BANK CMNT PATIENT-IMP: NORMAL
BLOOD BANK CMNT PATIENT-IMP: NORMAL
DIFFERENTIAL METHOD BLD: ABNORMAL
EOSINOPHIL # BLD AUTO: 0 10E9/L (ref 0–0.7)
EOSINOPHIL NFR BLD AUTO: 0.2 %
ERYTHROCYTE [DISTWIDTH] IN BLOOD BY AUTOMATED COUNT: 13.7 % (ref 10–15)
HCT VFR BLD AUTO: 40.6 % (ref 35–47)
HGB BLD-MCNC: 13.5 G/DL (ref 11.7–15.7)
IMM GRANULOCYTES # BLD: 0.2 10E9/L (ref 0–0.4)
IMM GRANULOCYTES NFR BLD: 1.2 %
LYMPHOCYTES # BLD AUTO: 3.5 10E9/L (ref 0.8–5.3)
LYMPHOCYTES NFR BLD AUTO: 25.9 %
MCH RBC QN AUTO: 31 PG (ref 26.5–33)
MCHC RBC AUTO-ENTMCNC: 33.3 G/DL (ref 31.5–36.5)
MCV RBC AUTO: 93 FL (ref 78–100)
MONOCYTES # BLD AUTO: 1 10E9/L (ref 0–1.3)
MONOCYTES NFR BLD AUTO: 6.9 %
NEUTROPHILS # BLD AUTO: 9 10E9/L (ref 1.6–8.3)
NEUTROPHILS NFR BLD AUTO: 65.6 %
NRBC # BLD AUTO: 0 10*3/UL
NRBC BLD AUTO-RTO: 0 /100
PLATELET # BLD AUTO: 261 10E9/L (ref 150–450)
RBC # BLD AUTO: 4.36 10E12/L (ref 3.8–5.2)
SPECIMEN EXP DATE BLD: NORMAL
WBC # BLD AUTO: 13.7 10E9/L (ref 4–11)

## 2021-03-29 PROCEDURE — 250N000013 HC RX MED GY IP 250 OP 250 PS 637: Performed by: OBSTETRICS & GYNECOLOGY

## 2021-03-29 PROCEDURE — 3E0T3BZ INTRODUCTION OF ANESTHETIC AGENT INTO PERIPHERAL NERVES AND PLEXI, PERCUTANEOUS APPROACH: ICD-10-PCS | Performed by: NURSE ANESTHETIST, CERTIFIED REGISTERED

## 2021-03-29 PROCEDURE — 86780 TREPONEMA PALLIDUM: CPT | Performed by: OBSTETRICS & GYNECOLOGY

## 2021-03-29 PROCEDURE — 370N000017 HC ANESTHESIA TECHNICAL FEE, PER MIN: Performed by: OBSTETRICS & GYNECOLOGY

## 2021-03-29 PROCEDURE — 86901 BLOOD TYPING SEROLOGIC RH(D): CPT | Performed by: OBSTETRICS & GYNECOLOGY

## 2021-03-29 PROCEDURE — 272N000001 HC OR GENERAL SUPPLY STERILE: Performed by: OBSTETRICS & GYNECOLOGY

## 2021-03-29 PROCEDURE — 86850 RBC ANTIBODY SCREEN: CPT | Performed by: OBSTETRICS & GYNECOLOGY

## 2021-03-29 PROCEDURE — 64488 TAP BLOCK BI INJECTION: CPT | Mod: XU | Performed by: NURSE ANESTHETIST, CERTIFIED REGISTERED

## 2021-03-29 PROCEDURE — 76815 OB US LIMITED FETUS(S): CPT

## 2021-03-29 PROCEDURE — 258N000003 HC RX IP 258 OP 636: Performed by: OBSTETRICS & GYNECOLOGY

## 2021-03-29 PROCEDURE — 710N000010 HC RECOVERY PHASE 1, LEVEL 2, PER MIN: Performed by: OBSTETRICS & GYNECOLOGY

## 2021-03-29 PROCEDURE — 120N000001 HC R&B MED SURG/OB

## 2021-03-29 PROCEDURE — 99140 ANES COMP EMERGENCY COND: CPT | Performed by: NURSE ANESTHETIST, CERTIFIED REGISTERED

## 2021-03-29 PROCEDURE — 59514 CESAREAN DELIVERY ONLY: CPT | Performed by: NURSE ANESTHETIST, CERTIFIED REGISTERED

## 2021-03-29 PROCEDURE — 76819 FETAL BIOPHYS PROFIL W/O NST: CPT

## 2021-03-29 PROCEDURE — 250N000011 HC RX IP 250 OP 636: Performed by: NURSE ANESTHETIST, CERTIFIED REGISTERED

## 2021-03-29 PROCEDURE — 86900 BLOOD TYPING SEROLOGIC ABO: CPT | Performed by: OBSTETRICS & GYNECOLOGY

## 2021-03-29 PROCEDURE — 85025 COMPLETE CBC W/AUTO DIFF WBC: CPT | Performed by: OBSTETRICS & GYNECOLOGY

## 2021-03-29 PROCEDURE — C9290 INJ, BUPIVACAINE LIPOSOME: HCPCS | Performed by: NURSE ANESTHETIST, CERTIFIED REGISTERED

## 2021-03-29 PROCEDURE — 250N000009 HC RX 250: Performed by: NURSE ANESTHETIST, CERTIFIED REGISTERED

## 2021-03-29 PROCEDURE — 59514 CESAREAN DELIVERY ONLY: CPT | Performed by: OBSTETRICS & GYNECOLOGY

## 2021-03-29 PROCEDURE — 250N000011 HC RX IP 250 OP 636: Performed by: OBSTETRICS & GYNECOLOGY

## 2021-03-29 PROCEDURE — 250N000009 HC RX 250: Performed by: OBSTETRICS & GYNECOLOGY

## 2021-03-29 PROCEDURE — 360N000076 HC SURGERY LEVEL 3, PER MIN: Performed by: OBSTETRICS & GYNECOLOGY

## 2021-03-29 RX ORDER — IBUPROFEN 800 MG/1
800 TABLET, FILM COATED ORAL EVERY 6 HOURS
Status: DISCONTINUED | OUTPATIENT
Start: 2021-03-30 | End: 2021-03-29

## 2021-03-29 RX ORDER — OXYTOCIN/0.9 % SODIUM CHLORIDE 30/500 ML
PLASTIC BAG, INJECTION (ML) INTRAVENOUS PRN
Status: DISCONTINUED | OUTPATIENT
Start: 2021-03-29 | End: 2021-03-29

## 2021-03-29 RX ORDER — BUPIVACAINE HYDROCHLORIDE 2.5 MG/ML
INJECTION, SOLUTION EPIDURAL; INFILTRATION; INTRACAUDAL PRN
Status: DISCONTINUED | OUTPATIENT
Start: 2021-03-29 | End: 2021-03-29

## 2021-03-29 RX ORDER — OXYTOCIN/0.9 % SODIUM CHLORIDE 30/500 ML
340 PLASTIC BAG, INJECTION (ML) INTRAVENOUS CONTINUOUS PRN
Status: DISCONTINUED | OUTPATIENT
Start: 2021-03-29 | End: 2021-03-31 | Stop reason: HOSPADM

## 2021-03-29 RX ORDER — AMOXICILLIN 250 MG
1 CAPSULE ORAL 2 TIMES DAILY
Status: DISCONTINUED | OUTPATIENT
Start: 2021-03-29 | End: 2021-03-31 | Stop reason: HOSPADM

## 2021-03-29 RX ORDER — SIMETHICONE 80 MG
80 TABLET,CHEWABLE ORAL 4 TIMES DAILY PRN
Status: DISCONTINUED | OUTPATIENT
Start: 2021-03-29 | End: 2021-03-31 | Stop reason: HOSPADM

## 2021-03-29 RX ORDER — OXYTOCIN/0.9 % SODIUM CHLORIDE 30/500 ML
100 PLASTIC BAG, INJECTION (ML) INTRAVENOUS CONTINUOUS
Status: DISCONTINUED | OUTPATIENT
Start: 2021-03-29 | End: 2021-03-31 | Stop reason: HOSPADM

## 2021-03-29 RX ORDER — NALOXONE HYDROCHLORIDE 0.4 MG/ML
0.4 INJECTION, SOLUTION INTRAMUSCULAR; INTRAVENOUS; SUBCUTANEOUS
Status: DISCONTINUED | OUTPATIENT
Start: 2021-03-29 | End: 2021-03-31 | Stop reason: HOSPADM

## 2021-03-29 RX ORDER — SODIUM CHLORIDE, SODIUM LACTATE, POTASSIUM CHLORIDE, CALCIUM CHLORIDE 600; 310; 30; 20 MG/100ML; MG/100ML; MG/100ML; MG/100ML
INJECTION, SOLUTION INTRAVENOUS CONTINUOUS
Status: DISCONTINUED | OUTPATIENT
Start: 2021-03-29 | End: 2021-03-29 | Stop reason: HOSPADM

## 2021-03-29 RX ORDER — ONDANSETRON 4 MG/1
4 TABLET, ORALLY DISINTEGRATING ORAL EVERY 30 MIN PRN
Status: DISCONTINUED | OUTPATIENT
Start: 2021-03-29 | End: 2021-03-29 | Stop reason: HOSPADM

## 2021-03-29 RX ORDER — TRANEXAMIC ACID 10 MG/ML
1 INJECTION, SOLUTION INTRAVENOUS EVERY 30 MIN PRN
Status: DISCONTINUED | OUTPATIENT
Start: 2021-03-29 | End: 2021-03-31 | Stop reason: HOSPADM

## 2021-03-29 RX ORDER — OXYCODONE HYDROCHLORIDE 5 MG/1
5 TABLET ORAL EVERY 4 HOURS PRN
Status: DISCONTINUED | OUTPATIENT
Start: 2021-03-29 | End: 2021-03-31 | Stop reason: HOSPADM

## 2021-03-29 RX ORDER — NALOXONE HYDROCHLORIDE 0.4 MG/ML
0.4 INJECTION, SOLUTION INTRAMUSCULAR; INTRAVENOUS; SUBCUTANEOUS
Status: DISCONTINUED | OUTPATIENT
Start: 2021-03-29 | End: 2021-03-30 | Stop reason: CLARIF

## 2021-03-29 RX ORDER — METHYLERGONOVINE MALEATE 0.2 MG/ML
200 INJECTION INTRAVENOUS
Status: DISCONTINUED | OUTPATIENT
Start: 2021-03-29 | End: 2021-03-31 | Stop reason: HOSPADM

## 2021-03-29 RX ORDER — KETOROLAC TROMETHAMINE 30 MG/ML
30 INJECTION, SOLUTION INTRAMUSCULAR; INTRAVENOUS EVERY 6 HOURS
Status: DISCONTINUED | OUTPATIENT
Start: 2021-03-30 | End: 2021-03-29

## 2021-03-29 RX ORDER — OXYTOCIN 10 [USP'U]/ML
10 INJECTION, SOLUTION INTRAMUSCULAR; INTRAVENOUS
Status: DISCONTINUED | OUTPATIENT
Start: 2021-03-29 | End: 2021-03-31 | Stop reason: HOSPADM

## 2021-03-29 RX ORDER — NALOXONE HYDROCHLORIDE 0.4 MG/ML
0.2 INJECTION, SOLUTION INTRAMUSCULAR; INTRAVENOUS; SUBCUTANEOUS
Status: DISCONTINUED | OUTPATIENT
Start: 2021-03-29 | End: 2021-03-30 | Stop reason: CLARIF

## 2021-03-29 RX ORDER — CEFAZOLIN SODIUM 2 G/100ML
2 INJECTION, SOLUTION INTRAVENOUS
Status: COMPLETED | OUTPATIENT
Start: 2021-03-29 | End: 2021-03-29

## 2021-03-29 RX ORDER — HYDROCORTISONE 2.5 %
CREAM (GRAM) TOPICAL 3 TIMES DAILY PRN
Status: DISCONTINUED | OUTPATIENT
Start: 2021-03-29 | End: 2021-03-31 | Stop reason: HOSPADM

## 2021-03-29 RX ORDER — IBUPROFEN 800 MG/1
800 TABLET, FILM COATED ORAL EVERY 6 HOURS
Status: DISCONTINUED | OUTPATIENT
Start: 2021-03-30 | End: 2021-03-31 | Stop reason: HOSPADM

## 2021-03-29 RX ORDER — BISACODYL 10 MG
10 SUPPOSITORY, RECTAL RECTAL DAILY PRN
Status: DISCONTINUED | OUTPATIENT
Start: 2021-03-31 | End: 2021-03-31 | Stop reason: HOSPADM

## 2021-03-29 RX ORDER — KETOROLAC TROMETHAMINE 30 MG/ML
30 INJECTION, SOLUTION INTRAMUSCULAR; INTRAVENOUS EVERY 6 HOURS
Status: COMPLETED | OUTPATIENT
Start: 2021-03-29 | End: 2021-03-30

## 2021-03-29 RX ORDER — ONDANSETRON 2 MG/ML
4 INJECTION INTRAMUSCULAR; INTRAVENOUS EVERY 30 MIN PRN
Status: DISCONTINUED | OUTPATIENT
Start: 2021-03-29 | End: 2021-03-29 | Stop reason: HOSPADM

## 2021-03-29 RX ORDER — CITRIC ACID/SODIUM CITRATE 334-500MG
30 SOLUTION, ORAL ORAL
Status: COMPLETED | OUTPATIENT
Start: 2021-03-29 | End: 2021-03-29

## 2021-03-29 RX ORDER — ACETAMINOPHEN 325 MG/1
975 TABLET ORAL ONCE
Status: COMPLETED | OUTPATIENT
Start: 2021-03-29 | End: 2021-03-29

## 2021-03-29 RX ORDER — FENTANYL CITRATE 50 UG/ML
25-50 INJECTION, SOLUTION INTRAMUSCULAR; INTRAVENOUS
Status: DISCONTINUED | OUTPATIENT
Start: 2021-03-29 | End: 2021-03-29 | Stop reason: HOSPADM

## 2021-03-29 RX ORDER — SODIUM CHLORIDE, SODIUM LACTATE, POTASSIUM CHLORIDE, CALCIUM CHLORIDE 600; 310; 30; 20 MG/100ML; MG/100ML; MG/100ML; MG/100ML
INJECTION, SOLUTION INTRAVENOUS CONTINUOUS
Status: DISCONTINUED | OUTPATIENT
Start: 2021-03-29 | End: 2021-03-29

## 2021-03-29 RX ORDER — ACETAMINOPHEN 325 MG/1
975 TABLET ORAL EVERY 6 HOURS
Status: DISCONTINUED | OUTPATIENT
Start: 2021-03-29 | End: 2021-03-31 | Stop reason: HOSPADM

## 2021-03-29 RX ORDER — LIDOCAINE 40 MG/G
CREAM TOPICAL
Status: DISCONTINUED | OUTPATIENT
Start: 2021-03-29 | End: 2021-03-30 | Stop reason: CLARIF

## 2021-03-29 RX ORDER — ONDANSETRON 2 MG/ML
4 INJECTION INTRAMUSCULAR; INTRAVENOUS EVERY 6 HOURS PRN
Status: DISCONTINUED | OUTPATIENT
Start: 2021-03-29 | End: 2021-03-31 | Stop reason: HOSPADM

## 2021-03-29 RX ORDER — NALOXONE HYDROCHLORIDE 0.4 MG/ML
0.2 INJECTION, SOLUTION INTRAMUSCULAR; INTRAVENOUS; SUBCUTANEOUS
Status: DISCONTINUED | OUTPATIENT
Start: 2021-03-29 | End: 2021-03-31 | Stop reason: HOSPADM

## 2021-03-29 RX ORDER — LIDOCAINE 40 MG/G
CREAM TOPICAL
Status: DISCONTINUED | OUTPATIENT
Start: 2021-03-29 | End: 2021-03-29

## 2021-03-29 RX ORDER — CARBOPROST TROMETHAMINE 250 UG/ML
250 INJECTION, SOLUTION INTRAMUSCULAR
Status: DISCONTINUED | OUTPATIENT
Start: 2021-03-29 | End: 2021-03-31 | Stop reason: HOSPADM

## 2021-03-29 RX ORDER — AMOXICILLIN 250 MG
2 CAPSULE ORAL 2 TIMES DAILY
Status: DISCONTINUED | OUTPATIENT
Start: 2021-03-29 | End: 2021-03-31 | Stop reason: HOSPADM

## 2021-03-29 RX ORDER — ONDANSETRON 2 MG/ML
INJECTION INTRAMUSCULAR; INTRAVENOUS PRN
Status: DISCONTINUED | OUTPATIENT
Start: 2021-03-29 | End: 2021-03-29

## 2021-03-29 RX ORDER — MODIFIED LANOLIN
OINTMENT (GRAM) TOPICAL
Status: DISCONTINUED | OUTPATIENT
Start: 2021-03-29 | End: 2021-03-31 | Stop reason: HOSPADM

## 2021-03-29 RX ORDER — BUPIVACAINE HYDROCHLORIDE 7.5 MG/ML
INJECTION, SOLUTION INTRASPINAL PRN
Status: DISCONTINUED | OUTPATIENT
Start: 2021-03-29 | End: 2021-03-29

## 2021-03-29 RX ORDER — DEXAMETHASONE SODIUM PHOSPHATE 10 MG/ML
INJECTION, SOLUTION INTRAMUSCULAR; INTRAVENOUS PRN
Status: DISCONTINUED | OUTPATIENT
Start: 2021-03-29 | End: 2021-03-29

## 2021-03-29 RX ORDER — DEXTROSE, SODIUM CHLORIDE, SODIUM LACTATE, POTASSIUM CHLORIDE, AND CALCIUM CHLORIDE 5; .6; .31; .03; .02 G/100ML; G/100ML; G/100ML; G/100ML; G/100ML
INJECTION, SOLUTION INTRAVENOUS CONTINUOUS
Status: DISCONTINUED | OUTPATIENT
Start: 2021-03-29 | End: 2021-03-31 | Stop reason: HOSPADM

## 2021-03-29 RX ADMIN — CEFAZOLIN SODIUM 2 G: 2 INJECTION, SOLUTION INTRAVENOUS at 19:33

## 2021-03-29 RX ADMIN — ACETAMINOPHEN 975 MG: 325 TABLET, FILM COATED ORAL at 19:33

## 2021-03-29 RX ADMIN — BUPIVACAINE 10 ML: 13.3 INJECTION, SUSPENSION, LIPOSOMAL INFILTRATION at 20:55

## 2021-03-29 RX ADMIN — OXYTOCIN-SODIUM CHLORIDE 0.9% IV SOLN 30 UNIT/500ML 500 ML: 30-0.9/5 SOLUTION at 20:22

## 2021-03-29 RX ADMIN — SODIUM CHLORIDE, POTASSIUM CHLORIDE, SODIUM LACTATE AND CALCIUM CHLORIDE: 600; 310; 30; 20 INJECTION, SOLUTION INTRAVENOUS at 19:33

## 2021-03-29 RX ADMIN — KETOROLAC TROMETHAMINE 30 MG: 30 INJECTION, SOLUTION INTRAMUSCULAR at 23:03

## 2021-03-29 RX ADMIN — DEXAMETHASONE SODIUM PHOSPHATE 5 MG: 10 INJECTION, SOLUTION INTRAMUSCULAR; INTRAVENOUS at 20:57

## 2021-03-29 RX ADMIN — DEXAMETHASONE SODIUM PHOSPHATE 5 MG: 10 INJECTION, SOLUTION INTRAMUSCULAR; INTRAVENOUS at 20:55

## 2021-03-29 RX ADMIN — BUPIVACAINE HYDROCHLORIDE 30 ML: 2.5 INJECTION, SOLUTION EPIDURAL; INFILTRATION; INTRACAUDAL at 20:55

## 2021-03-29 RX ADMIN — BUPIVACAINE HYDROCHLORIDE IN DEXTROSE 1.8 ML: 7.5 INJECTION, SOLUTION SUBARACHNOID at 20:03

## 2021-03-29 RX ADMIN — Medication 100 ML/HR: at 21:27

## 2021-03-29 RX ADMIN — BUPIVACAINE 10 ML: 13.3 INJECTION, SUSPENSION, LIPOSOMAL INFILTRATION at 20:57

## 2021-03-29 RX ADMIN — ONDANSETRON 4 MG: 2 INJECTION INTRAMUSCULAR; INTRAVENOUS at 20:06

## 2021-03-29 RX ADMIN — SODIUM CITRATE AND CITRIC ACID MONOHYDRATE 30 ML: 500; 334 SOLUTION ORAL at 19:33

## 2021-03-29 RX ADMIN — BUPIVACAINE HYDROCHLORIDE 30 ML: 2.5 INJECTION, SOLUTION EPIDURAL; INFILTRATION; INTRACAUDAL at 20:57

## 2021-03-29 RX ADMIN — SODIUM CHLORIDE, POTASSIUM CHLORIDE, SODIUM LACTATE AND CALCIUM CHLORIDE: 600; 310; 30; 20 INJECTION, SOLUTION INTRAVENOUS at 20:30

## 2021-03-29 ASSESSMENT — MIFFLIN-ST. JEOR: SCORE: 1578.22

## 2021-03-29 ASSESSMENT — LIFESTYLE VARIABLES: TOBACCO_USE: 1

## 2021-03-29 NOTE — PROVIDER NOTIFICATION
Dr. Alvarez updated on pts strip. States he will be in to review the strip and wants the BPP done.

## 2021-03-29 NOTE — PLAN OF CARE
OB Triage Note  Ambreen Flores  MRN: [id number]  Gestational Age: 41w2d      Ambreen Flores presents for NST and BPP (sign/symptom/concern).      Reports some jaime hansen contractions, denies any leaking of fluid or bleeding at this time.     Dr. Alvarez notified of arrival and condition.  Oriented patient to surroundings. Call light within reach.     FHT: 135    Plan:  -Initial NST, then fetal/uterine monitoring per MD/patient plan.  -Sterile vaginal exam/sterile speculum exam.  -Nursing education provided.

## 2021-03-30 LAB — HGB BLD-MCNC: 11.6 G/DL (ref 11.7–15.7)

## 2021-03-30 PROCEDURE — 85018 HEMOGLOBIN: CPT | Performed by: OBSTETRICS & GYNECOLOGY

## 2021-03-30 PROCEDURE — 120N000001 HC R&B MED SURG/OB

## 2021-03-30 PROCEDURE — 36415 COLL VENOUS BLD VENIPUNCTURE: CPT | Performed by: OBSTETRICS & GYNECOLOGY

## 2021-03-30 PROCEDURE — 250N000011 HC RX IP 250 OP 636: Performed by: OBSTETRICS & GYNECOLOGY

## 2021-03-30 PROCEDURE — 250N000013 HC RX MED GY IP 250 OP 250 PS 637: Performed by: OBSTETRICS & GYNECOLOGY

## 2021-03-30 RX ADMIN — KETOROLAC TROMETHAMINE 30 MG: 30 INJECTION, SOLUTION INTRAMUSCULAR at 04:56

## 2021-03-30 RX ADMIN — OXYCODONE HYDROCHLORIDE 5 MG: 5 TABLET ORAL at 16:28

## 2021-03-30 RX ADMIN — OXYCODONE HYDROCHLORIDE 5 MG: 5 TABLET ORAL at 10:09

## 2021-03-30 RX ADMIN — IBUPROFEN 800 MG: 800 TABLET ORAL at 17:32

## 2021-03-30 RX ADMIN — ACETAMINOPHEN 975 MG: 325 TABLET, FILM COATED ORAL at 09:44

## 2021-03-30 RX ADMIN — OXYCODONE HYDROCHLORIDE 5 MG: 5 TABLET ORAL at 21:05

## 2021-03-30 RX ADMIN — SENNOSIDES AND DOCUSATE SODIUM 2 TABLET: 8.6; 5 TABLET ORAL at 21:05

## 2021-03-30 RX ADMIN — ACETAMINOPHEN 975 MG: 325 TABLET, FILM COATED ORAL at 15:56

## 2021-03-30 RX ADMIN — KETOROLAC TROMETHAMINE 30 MG: 30 INJECTION, SOLUTION INTRAMUSCULAR at 11:01

## 2021-03-30 RX ADMIN — SIMETHICONE 80 MG: 80 TABLET, CHEWABLE ORAL at 16:28

## 2021-03-30 RX ADMIN — ACETAMINOPHEN 975 MG: 325 TABLET, FILM COATED ORAL at 03:51

## 2021-03-30 RX ADMIN — ACETAMINOPHEN 975 MG: 325 TABLET, FILM COATED ORAL at 21:04

## 2021-03-30 RX ADMIN — IBUPROFEN 800 MG: 800 TABLET ORAL at 23:23

## 2021-03-30 NOTE — PLAN OF CARE
Assessments as charted. B/P: 116/60  T: 97.9 P: 83 R: 16. Denies pain at this time. Incision dressing marked & drainage unchanged. Fundus firm without massage, midline -2 below umbilicus. Lochia light, no clots. Pt. has been up ambulating in room, steady on feet. Breast feeding going well. Patient denies needs. Will continue to monitor.

## 2021-03-30 NOTE — PLAN OF CARE
Pt brought down to pacu at this time by YAN Nunez. Report given at aprox 1930. Pt prepped, iv started medications given prior to going down.

## 2021-03-30 NOTE — OR NURSING
Report given to YAN Nunez. Patient denies pain ands discomforts at this time. Umbilicus at 2 below with moderate loss. No questions and this time.

## 2021-03-30 NOTE — ANESTHESIA POSTPROCEDURE EVALUATION
Patient: Ambreen Flores    Procedure(s):   SECTION    Diagnosis:Engagement of fetus in breech position [O32.1XX0]  Diagnosis Additional Information: No value filed.    Anesthesia Type:  Spinal    Note:  Disposition: Admission   Postop Pain Control: Uneventful            Sign Out: Well controlled pain   PONV: No   Neuro/Psych: Uneventful            Sign Out: Acceptable/Baseline neuro status   Airway/Respiratory: Uneventful            Sign Out: Acceptable/Baseline resp. status   CV/Hemodynamics: Uneventful            Sign Out: Acceptable CV status   Other NRE: NONE   DID A NON-ROUTINE EVENT OCCUR? No         Last vitals:  Vitals:    21 0032 21 0200 21 0249   BP:  129/74 116/68   Pulse:  98 92   Resp:  16    Temp:      SpO2: 96% 96% 96%       Last vitals prior to Anesthesia Care Transfer:  CRNA VITALS  3/29/2021 2030 - 3/29/2021 2130      3/29/2021             Resp Rate (set):  8          Electronically Signed By: JOLYNN Bond CRNA  2021  2:58 AM

## 2021-03-30 NOTE — H&P
[facility]    History and Physical  Obstetrics and Gynecology     Date of Admission:  3/29/2021    Assessment & Plan   Ambreen Flores is a 25 year old female who presents with non reactive NST, breech presentation, contractions, 41w 2d    ASSESSMENT:   IUP @ 41w2d in latent labor.  NST non-reactive.  Category  II  BREECH Presentation by US    PLAN:   Admit - see IP orders  Prepare for     oJey Alvarez    History of Present Illness   Ambreen Flores is a 25 year old female  41w2d  Estimated Date of Delivery: Mar 20, 2021 is calculated from No LMP recorded. Patient is pregnant. [facility]  in latent labor.    PRENATAL COURSE  Prenatal course was essentially uncomplicated      No lab results found.  Rhogam not indicated   No lab results found.    Past Medical History    I have reviewed this patient's medical history and updated it with pertinent information if needed.   No past medical history on file.    Past Surgical History   I have reviewed this patient's surgical history and updated it with pertinent information if needed.  No past surgical history on file.    Prior to Admission Medications   Prior to Admission Medications   Prescriptions Last Dose Informant Patient Reported? Taking?   Prenatal Vit-Fe Fumarate-FA (PRENATAL MULTIVITAMIN W/IRON) 27-0.8 MG tablet 3/29/2021 at 0900  Yes Yes   Sig: Take 1 tablet by mouth daily      Facility-Administered Medications: None     Allergies   Allergies   Allergen Reactions     Seasonal Allergies        Social History   I have reviewed this patient's social history and updated it with pertinent information if needed. Ambreen Flores      Family History   I have reviewed this patient's family history and updated it with pertinent information if needed.   No family history on file.    Immunization History   Immunizations are up to date    REVIEW OF SYSTEMS  General: negative  Skin: negative  Eyes: negative  ENT: negative  Resp: No  shortness of breath, dyspnea on exertion, cough, or hemoptysis  CV: negative  GI: negative  : negative  Neurologic: negative  Psychiatric: negative  Hematologic: negative    Physical Exam   Temp: 98.2  F (36.8  C) Temp src: Oral BP: 138/85 Pulse: 113   Resp: 16 SpO2: 97 %      Vital Signs with Ranges  Temp:  [98.2  F (36.8  C)] 98.2  F (36.8  C)  Pulse:  [113] 113  Resp:  [16] 16  BP: (138)/(85) 138/85  SpO2:  [97 %] 97 %    Abdomen: gravid, single BREECH by US  fetus, non-tender, EFW 7 lbs 12  Cervical Exam: NI    Fetal Heart Tones: 150's  baseline, moderate variablility, + accels, variables decelerations and Category II  TOCO:   frequency q 3 minutes    Constitutional: healthy, alert, active, no distress, cooperative and smiling   Respiratory: No increased work of breathing, good air exchange, clear to auscultation bilaterally, no crackles or wheezing  Cardiovascular: Normal apical impulse, regular rate and rhythm, normal S1 and S2, no S3 or S4, and no murmur noted  Skin/Extremites: no bruising or bleeding, normal skin color, texture, turgor, no redness, warmth, or swelling, no rashes and no lesions  Neurologic: Awake, alert, oriented to name, place and time.  Cranial nerves II-XII are grossly intact.  Motor is 5 out of 5 bilaterally.  Cerebellar finger to nose, heel to shin intact.  Sensory is intact.  Babinski down going, Romberg negative, and gait is normal.  Neuropsychiatric: General: normal, calm and normal eye contact  Level of consciousness: alert / normal  Affect: normal, pleasant and full  Orientation: oriented to self, place, time and situation

## 2021-03-30 NOTE — OP NOTE
Procedure Date: 2021      PREOPERATIVE DIAGNOSES:   1.  Intrauterine pregnancy at 41 weeks 2 day gestation.   2.  Ultrasound-confirmed breech presentation.   3.  Early labor.   4.  Category 2 fetal heart tracing.   5.  Nonreactive nonstress test.      POSTOPERATIVE DIAGNOSES:   1.  Intrauterine pregnancy at 41 weeks 2 day gestation.   2.  Ultrasound-confirmed breech presentation.   3.  Early labor.   4.  Category 2 fetal heart tracing.   5.  Nonreactive nonstress test.      PROCEDURE:  Primary  section, low transverse cervical uterine incision with single-layer closure.      SURGEON:  Joey Alvarez MD      ASSISTANT:  brenda Gregorio.      ANESTHESIA:  Spinal block with TAP block.      ESTIMATED BLOOD LOSS:  750 mL.      COMPLICATIONS:  None.      CONDITION:  Stable to recovery room.      DRAINS:  Latex-free Art catheter to the bladder with drainage of clear urine.      PACKS:  None.      COUNTS:  Sponge and needle count reported to be correct.  Instrument count reported to be correct.      SPECIMENS:  Placenta was saved for potential further examination.      OPERATIVE FINDINGS:  Live female infant, breech presentation, left sacrum anterior, Apgars 8 and 9.  Placenta appeared to be normal and was saved.  Tubes were normal.  Uterus normal.  Ovaries were normal.      INDICATIONS FOR PROCEDURE:  This is a 25-year-old patient of Noah Pratt MD, who was scheduled for induction of labor tomorrow on , however on exam was found to suspected to be breech presentation.  She had ultrasound and confirmed a breech presentation; therefore, she was going to be scheduled for elective primary  section on , but since she was postdates, she was brought in for a nonstress test and biophysical profile.  Nonstress test was nonreactive and had fetal decelerations, and the patient was having some contractions and non-reassuring fetal heart tracing, early labor, breech presentation, nonreactive nonstress  test.   was indicated at this point.      INFORMED CONSENT PROCESS:  I gave the patient informed consent regarding the risks, complications and alternatives to the proposed procedure.  The patient read detailed informed consent form, understood it and signed it and wished to proceed with surgery as proposed.  All questions were answered.  She seemed to have reasonable expectations regarding outcome of her surgery, its inherent risks, benefits and alternatives.      OPERATIVE TECHNIQUE:  The patient was taken down to the  room and was placed in sitting position and underwent spinal block anesthesia, placed in dorsal supine position.  Latex-free Art catheter was placed with drainage of clear urine.  Abdomen was prepped with ChloraPrep and allowed to dry for 3 minutes, and the patient was draped.      FORMAL SURGICAL TIMEOUT PROCESS:  I conducted a formal surgical timeout process with the operating room team with proper patient identification, allergy verification, procedure proposed, and special risk factors.  Surgical team was in agreement.        Anesthesia was satisfactory after testing.  A Pfannenstiel skin incision was made and was carried down to the fascia, and the fascia was incised and extended.  There was sharp dissection of the rectus fascia away from the rectus muscles.  Parietal peritoneum was entered sharply and extended bluntly.  Bladder blade was inserted.  Low transverse cervical uterine incision was made with a scalpel and extended bluntly.  Infant was delivered using standard breech techniques where there was flexion of the hips, sweeping of the arms and flexion of the head.  Cord was bilaterally clamped, cut, and infant was passed on to awaiting attending, Dr. Noah Pratt MD, with a vigorous cry.  Fetal cord sample was obtained for type and Rh.  Placenta had spontaneous removal and manual removal and was saved for potential further examination.  Uterus was wiped with a moist lap  sponge rigorously.  Uterus was wrapped with a moist lap sponge.  The uterus was closed with #1 chromic running interlocking suture.  There was no bleeding.  There was a search for sponges, and there were none.  There was no bleeding.  Copious amounts of irrigation were used.  Irrigant was clear.  Urine was clear and present.  Parietal peritoneum was run with 3-0 running Vicryl suture, everting the edges externally.  There was no bleeding.  Sponge and needle count reported to be correct.  Instrument count reported to be correct.  Copious amounts of irrigation were used until the irrigant was clear.  Urine was clear and present.  Fascia was run with 1 looped PDS suture, surgical knots buried.  There was no bleeding.  Sponge and needle count reported to be correct.  Instrument count reported to be correct.  Copious amounts of irrigation were used until the irrigant was clear.  Urine was clear and present.  Dead space was run with 3-0 running Vicryl suture.  There was no bleeding.  Copious amounts of irrigation were used until the irrigant was clear.  Skin clips were placed.  Pressure was applied.  Uterine clots were removed.  A dressing was applied.      FORMAL SURGICAL DEBRIEFING PROCESS:  I conducted a formal surgical debriefing, including proper patient verification, procedure performed, complications which were none, sponge and needle count status.      SPECIMENS:  Placenta was sent to be saved for potential further pathologic examination.        SPECIAL RISK FACTORS FOR POSTOP CARE:  The surgical team performed excellently and was very efficient.  Everything went well, cannot think of anything that could have been done differently.      At this point, the procedure was turned over to Anesthesia to perform a TAP block.         MARANDA LOCKETT MD             D: 2021   T: 2021   MT: MILA      Name:     TERRANCE BIANCHI   MRN:      [id number]        Account:        [id number]   :      1995            Procedure Date: 03/29/2021      Document: [id number]       cc: Noah Pratt MD

## 2021-03-30 NOTE — ANESTHESIA CARE TRANSFER NOTE
Patient: Ambreen Flores    Procedure(s):   SECTION    Diagnosis: Engagement of fetus in breech position [O32.1XX0]  Diagnosis Additional Information: No value filed.    Anesthesia Type:   Spinal     Note:    Oropharynx: spontaneously breathing  Level of Consciousness: awake  Oxygen Supplementation: room air    Independent Airway: airway patency satisfactory and stable  Dentition: dentition unchanged  Vital Signs Stable: post-procedure vital signs reviewed and stable  Report to RN Given: handoff report given  Patient transferred to: Labor and Delivery    Handoff Report: Identifed the Patient, Identified the Reponsible Provider, Reviewed the pertinent medical history, Discussed the surgical course, Reviewed Intra-OP anesthesia mangement and issues during anesthesia, Set expectations for post-procedure period and Allowed opportunity for questions and acknowledgement of understanding      Vitals: (Last set prior to Anesthesia Care Transfer)  CRNA VITALS  3/29/2021 2030 - 3/29/2021 2102      3/29/2021             Resp Rate (set):  8        Electronically Signed By: JOLYNN Bond CRNA  2021  9:02 PM

## 2021-03-30 NOTE — ANESTHESIA PROCEDURE NOTES
Intrathecal injection Procedure Note  Pre-Procedure   Staff -        CRNA: Richard Frank APRN CRNA       Performed By: CRNA       Location: OR       Procedure Start/Stop Times: 3/29/2021 7:59 PM and 3/29/2021 8:03 PM       Pre-Anesthestic Checklist: patient identified, IV checked, risks and benefits discussed, informed consent, monitors and equipment checked, pre-op evaluation, at physician/surgeon's request and post-op pain management  Timeout:       Correct Patient: Yes        Correct Procedure: Yes        Correct Site: Yes        Correct Position: Yes   Procedure Documentation  Procedure: intrathecal injection       Diagnosis: c section       Patient Position: sitting       Patient Prep/Sterile Barriers: sterile gloves, mask, patient draped       Skin prep: Betadine       Insertion Site: L3-4. (midline approach).       Needle Gauge: 25.        Needle Length (Inches): 3.5        Spinal Needle Type: Jacqui tip       Introducer used       Introducer: 20 G      # of attempts: 1 and  # of redirects:     Assessment/Narrative         Paresthesias: No.       CSF fluid: clear.

## 2021-03-30 NOTE — PLAN OF CARE
Pt status post primary  for breech presentation with contractions.  Baby's apgars 8 and 8.  Report obtained from Ranjana HEREDIA, assumed care pt at 2215.  Fundal checks and vitals per protocol.  Fundus firm at U-2, light lochia noted.  Small amount of shadowed drainage noted to incisional dressing.  Urine draining clear yellow urine.  Pt able to move legs but rt leg having some numbness and tingling still.  Pt reports adequate pain control with scheduled medications.  Will continue to monitor pt and provide cares as needed.

## 2021-03-30 NOTE — PROGRESS NOTES
[facility]    Obstetrics Post-Op / Progress Note    Assessment & Plan   Assessment:  -1 Day Post-Op  Procedure(s):   SECTION    Doing well.  Clean wound dressing.  No immediate surgical complications identified.  No excessive bleeding  Pain well-controlled.  Breast feeding well.    Plan:  Ambulation encouraged  Breast feeding strategies discussed  Monitor wound for signs of infection  Pain control measures as needed  Reportable signs and symptoms dicussed with the patient    Joey Eliasvikas     Interval History   Doing well.  Pain is well-controlled.  No fevers.  No history of wound drainage, warmth or significant erythema.  Good appetite.  Denies chest pain, shortness of breath, nausea or vomiting.  Ambulatory.  Breastfeeding well.    Medications     dextrose 5% lactated ringers       NO Rho (D) immune globulin (RhoGam) needed - mother Rh POSITIVE       oxytocin in 0.9% NaCl 100 mL/hr (21)     oxytocin in 0.9% NaCl         acetaminophen  975 mg Oral Q6H     ibuprofen  800 mg Oral Q6H     ketorolac  30 mg Intravenous Q6H     Measles, Mumps & Rubella Vac  0.5 mL Subcutaneous Once     senna-docusate  1 tablet Oral BID    Or     senna-docusate  2 tablet Oral BID     sodium chloride (PF)  3 mL Intracatheter Q8H     Tdap (tetanus-diphtheria-acell pertussis)  0.5 mL Intramuscular Once       Physical Exam   Temp: 98.6  F (37  C) Temp src: Oral BP: 123/68 Pulse: 89   Resp: 16 SpO2: 95 % O2 Device: None (Room air)    Vitals:    21 1831   Weight: 81.6 kg (180 lb)     Vital Signs with Ranges  Temp:  [97.5  F (36.4  C)-99.2  F (37.3  C)] 98.6  F (37  C)  Pulse:  [] 89  Resp:  [16-18] 16  BP: ()/(66-94) 123/68  SpO2:  [94 %-98 %] 95 %  I/O last 3 completed shifts:  In: 3422 [P.O.:1800; I.V.:1622]  Out: 4250 [Urine:3500; Blood:750]    Uterine fundus is firm, non-tender and at the level of the umbilicus  Incision C/D/I  Extremities Non-tender  Heart is regular rate and rhythm and  lungs clear to auscultation  Neuro normal  Reflexes 2+, equal, symmetric, without clonus  Urine output is good and clear    Data   Recent Labs   Lab Test 03/29/21 1934   ABO O   RH Pos   AS Neg     Recent Labs   Lab Test 03/30/21  0550 03/29/21 1934   HGB 11.6* 13.5     No lab results found.

## 2021-03-30 NOTE — PLAN OF CARE
Assessments as charted. B/P: 132/71, T: 98.2, P: 92, R: 16. Rates pain: 2/10 incisional. Incision: dressing clean and dry. Voiding without difficulty. Fundus Firm 2 below umbilicus. Lochia: Light. Activity: unrestricted w/mild pain. Infant feeding: Breast feeding going well.     LATCH Score:   Latch: 2 - Good Latch  Audible Swallowin - Spontaneous & frequent  Type of Nipple: (Breast/Nipple) 2 - Everted  Comfort: 2 - Soft, Nontender  Hold: 2 - No Assist   Total LATCH Score:   10  Postpartum breastfeeding assessment completed and education provided, see Patient Education Activity.  Items included in the education are:     proper positioning and latch    effectiveness of feeding    manual expression    handling and storing breastmilk    maintenance of breastfeeding for the first 6 months    sign/symptoms of infant feeding issues requiring referral to qualified health care provider  Postpartum care education provided, see Patient Education activity. Patient denies needs. Will monitor.  NORMA SINGLETON RN

## 2021-03-30 NOTE — PLAN OF CARE
Pt's fundus firm at U-1, light lochia noted.  Old shadowed blood on dressing unchanged.  Art removed at 0645, pt out of bed with assist of 2.  Pt ambulated in room and tolerated activity well.  Pt aware to call with needs.     Face to face report given with opportunity to observe patient.    Report given to Teena Liu RN   3/30/2021  7:41 AM

## 2021-03-30 NOTE — PLAN OF CARE
Pt resting in bed, Fundus firm at U-1 to U-2, stout putting out large amount of clear yellow urine.  Plan to stand pt and removed stout after she is done feeding baby.  Pt reports adequate pain control.  Assistance provided with feedings as needed.

## 2021-03-30 NOTE — OP NOTE
[facility]    OB Brief Operative Note   3/29/2021    Pre-operative diagnosis: Breech  Early labor  CAT II FHT  Non reactive NST   Post-operative diagnosis Same   Procedure: Procedure(s):   SECTION   Surgeon: Joey Alvarez   Assistants(s): Jg becerra   Anesthesia: Spinal with TAP block   Estimated blood loss: 750 ml    Total IV fluids: (See anesthesia record)   Blood transfusion: No transfusion was given during surgery   Total urine output: (See anesthesia record)   Drains: None   Specimens: Placenta saved   Findings: Live   Female  Breech presentation: left sacrum anterior  APGARS: 1 minute: 8   5 minute: 9  Placenta: normal appearing and saved  Tubes: normal  Uterus: normal  Ovaries: normal   Complications: None   Condition: Infant stable, transferred to general care nursery  Mother stable, transfered to floor   Comments: See dictated operative report for full details

## 2021-03-30 NOTE — ANESTHESIA PROCEDURE NOTES
TAP Procedure Note  Pre-Procedure   Staff -        CRNA: Richard Frank APRN CRNA       Performed By: CRNA       Location: OR       Procedure Start/Stop Times: 3/29/2021 8:53 PM and 3/29/2021 8:58 PM       Pre-Anesthestic Checklist: patient identified, IV checked, site marked, risks and benefits discussed, informed consent, monitors and equipment checked, pre-op evaluation, at physician/surgeon's request and post-op pain management  Timeout:       Correct Patient: Yes        Correct Procedure: Yes        Correct Site: Yes        Correct Position: Yes        Correct Laterality: Yes        Site Marked: Yes  Procedure Documentation  Procedure: TAP       Diagnosis: C SECTION       Laterality: bilateral       Patient Position: supine       Skin prep: Chloraprep       Needle Type: insulated       Needle Gauge: 20.        Needle Length (Inches): 4        - Ultrasound guided       - Ultrasound used to identify targeted nerve, plexus, vascular marker, or fascial plane and place a needle adjacent to it in real-time       - Ultrasound was used to visualize the spread of anesthetic in close proximity to the above referenced structure       - A permanent image is entered into the patient's record.       - The nerve(s) appeared anatomically normal       - There were no apparent abnormal pathologic findings    Assessment/Narrative         The placement was negative for: blood aspirated, painful injection and site bleeding       Paresthesias: No.      Bolus given via needle. No blood aspirated via catheter.        Secured via.        Insertion/Infusion Method: Single Shot       Complications: none       Injection made incrementally with aspirations every 5 mL.

## 2021-03-30 NOTE — PROGRESS NOTES
Care Transitions focused note:      Briefly checked in with Ambreen.  Reviewed  letter.  No questions or concerns.

## 2021-03-30 NOTE — ANESTHESIA PREPROCEDURE EVALUATION
Anesthesia Pre-Procedure Evaluation    Patient: Ambreen Flores   MRN: [id number] : 1995        Preoperative Diagnosis: Engagement of fetus in breech position [O32.1XX0]   Procedure : Procedure(s):   SECTION     No past medical history on file.   No past surgical history on file.   Allergies   Allergen Reactions     Seasonal Allergies       Social History     Tobacco Use     Smoking status: Not on file   Substance Use Topics     Alcohol use: Not on file      Wt Readings from Last 1 Encounters:   21 81.6 kg (180 lb)        Anesthesia Evaluation   Pt has not had prior anesthetic         ROS/MED HX  ENT/Pulmonary:     (+) tobacco use,     Neurologic:  - neg neurologic ROS     Cardiovascular:  - neg cardiovascular ROS     METS/Exercise Tolerance: >4 METS    Hematologic:  - neg hematologic  ROS     Musculoskeletal:  - neg musculoskeletal ROS     GI/Hepatic:  - neg GI/hepatic ROS     Renal/Genitourinary:  - neg Renal ROS     Endo:  - neg endo ROS     Psychiatric/Substance Use:     (+) psychiatric history depression (adhd)     Infectious Disease:  - neg infectious disease ROS     Malignancy:  - neg malignancy ROS     Other:  - neg other ROS          Physical Exam    Airway  airway exam normal      Mallampati: I   TM distance: > 3 FB   Neck ROM: full   Mouth opening: > 3 cm    Respiratory Devices and Support         Dental  no notable dental history         Cardiovascular   cardiovascular exam normal       Rhythm and rate: regular and normal     Pulmonary   pulmonary exam normal        breath sounds clear to auscultation           OUTSIDE LABS:  CBC: No results found for: WBC, HGB, HCT, PLT  BMP: No results found for: NA, POTASSIUM, CHLORIDE, CO2, BUN, CR, GLC  COAGS: No results found for: PTT, INR, FIBR  POC: No results found for: BGM, HCG, HCGS  HEPATIC: No results found for: ALBUMIN, PROTTOTAL, ALT, AST, GGT, ALKPHOS, BILITOTAL, BILIDIRECT, MARINA  OTHER: No results found for: PH, LACT, A1C, LAYLA,  PHOS, MAG, LIPASE, AMYLASE, TSH, T4, T3, CRP, SED    Anesthesia Plan    ASA Status:  2, emergent    NPO Status:  ELEVATED Aspiration Risk/Unknown    Anesthesia Type: Spinal.   Induction: N/a.   Maintenance: N/A.        Consents    Anesthesia Plan(s) and associated risks, benefits, and realistic alternatives discussed. Questions answered and patient/representative(s) expressed understanding.     - Discussed with:  Patient         Postoperative Care    Pain management: Neuraxial analgesia, Peripheral nerve block (Single Shot).   PONV prophylaxis: Ondansetron (or other 5HT-3), Dexamethasone or Solumedrol     Comments:    Discussed risks and benefits of spinal anesthetic with patient including itching, sore back, infection, hematoma, spinal headache, CV complications, nerve damage, inability to place, conversion to general anesthesia, loss of airway, and death. Pt wishes to proceed.             JOLYNN Bond CRNA

## 2021-03-31 VITALS
OXYGEN SATURATION: 95 % | SYSTOLIC BLOOD PRESSURE: 106 MMHG | WEIGHT: 180 LBS | HEART RATE: 97 BPM | HEIGHT: 66 IN | BODY MASS INDEX: 28.93 KG/M2 | DIASTOLIC BLOOD PRESSURE: 52 MMHG | RESPIRATION RATE: 16 BRPM | TEMPERATURE: 97.9 F

## 2021-03-31 LAB — T PALLIDUM AB SER QL: NONREACTIVE

## 2021-03-31 PROCEDURE — 250N000013 HC RX MED GY IP 250 OP 250 PS 637: Performed by: OBSTETRICS & GYNECOLOGY

## 2021-03-31 RX ORDER — OXYCODONE AND ACETAMINOPHEN 5; 325 MG/1; MG/1
1 TABLET ORAL EVERY 6 HOURS PRN
Qty: 25 TABLET | Refills: 0 | Status: SHIPPED | OUTPATIENT
Start: 2021-03-31 | End: 2021-04-05

## 2021-03-31 RX ORDER — IBUPROFEN 800 MG/1
800 TABLET, FILM COATED ORAL EVERY 8 HOURS PRN
Qty: 50 TABLET | Refills: 1 | Status: SHIPPED | OUTPATIENT
Start: 2021-03-31 | End: 2021-04-28

## 2021-03-31 RX ADMIN — IBUPROFEN 800 MG: 800 TABLET ORAL at 12:30

## 2021-03-31 RX ADMIN — IBUPROFEN 800 MG: 800 TABLET ORAL at 05:04

## 2021-03-31 RX ADMIN — SENNOSIDES AND DOCUSATE SODIUM 1 TABLET: 8.6; 5 TABLET ORAL at 09:57

## 2021-03-31 RX ADMIN — ACETAMINOPHEN 975 MG: 325 TABLET, FILM COATED ORAL at 09:57

## 2021-03-31 RX ADMIN — ACETAMINOPHEN 975 MG: 325 TABLET, FILM COATED ORAL at 03:40

## 2021-03-31 NOTE — PLAN OF CARE
Assessments as charted. B/P: 106/52  T: 97.9 P: 97 R: 16. Denies pain at this time.  Meds given as documented on MAR. Incision intact with staples, healing well, well approximated, without signs of infection and no drainage. Voiding without difficulty. Fundus firm without massage, midline, -2 umbilicus. Lochia: Light, no clots. Normal activity, showered today. Breast feeding going well. Patient denies needs. Will monitor.

## 2021-03-31 NOTE — PLAN OF CARE
Face to face report given with opportunity to observe patient.    Report given to Mayra SINGLETON RN   3/30/2021  11:13 PM

## 2021-03-31 NOTE — DISCHARGE INSTRUCTIONS
Appointments  Date: May 5th, 2021  Time: 9:45 AM  Location: [facility]  Provider: Dr. Pratt    Postop  Birth Instructions    Activity    Do not lift more than 10 pounds for 6 weeks after surgery.  Ask family and friends for help when you need it.  No driving until you have stopped taking your narcotic pain medications   No heavy exercise or activity for 6 weeks.  Don't do anything that will put a strain on your surgery site.  Don't strain when using the toilet.  Your care team may prescribe a stool softener if you have problems with your bowel movements.    To care for your incision:    Keep the incision clean and dry.  Do not soak your incision in water. No swimming or hot tubs until it has fully healed. You may soak in the bathtub if the water level is below your incision.  Do not use peroxide, gel, cream, lotion, or ointment on your incision.  Adjust your clothes to avoid pressure on your surgery site (check the elastic in your underwear for example).    You may see a small amount of clear or pink drainage and this is normal.      Check with your health care provider:    If the drainage increases or has an odor.  If the incision reddens, you have swelling, or develop a rash.  If you have increased pain and the medicine we prescribed doesn't help.  If you have a fever above 100.4 F (38 C) with or without chills when placing thermometer under your tongue.  The area around your incision (surgery wound), will feel numb.  This is normal. The numbness should go away in less than a year.     Keep your hands clean:  Always wash your hands before touching your incision (surgery wound). This helps reduce your risk of infection. If your hands aren't dirty, you may use an alcohol hand-rub to clean your hands. Keep your nails clean and short.    Call your healthcare provider if you have any of these symptoms:    You soak a sanitary pad with blood within 1 hour, or you see blood clots larger than a golf  ball.  Bleeding that lasts more than 6 weeks.  Vaginal discharge that smells bad.  Severe pain, cramping or tenderness in your lower belly area.  A need to urinate more frequently (use the toilet more often), more urgently (use the toilet very quickly), or it burns when you urinate.  Nausea and vomiting.  Redness, swelling or pain around a vein in your leg.  Problems breastfeeding or a red or painful area on your breast.  Chest pain and cough or are gasping for air.  Problems with coping with sadness, anxiety or depression. If you have concerns about hurting yourself or the baby, call your provider immediately. The Safe Place for Newborn[facility] law allows you to bring your baby to the hospital up to 7 days after birth, no questions asked.  You have questions or concerns after you return home.

## 2021-03-31 NOTE — PLAN OF CARE
Patient discharged at 1:45 PM via ambulation accompanied by significant other and son and staff. Prescriptions sent to patients preferred pharmacy. All belongings sent with patient.     Discharge instructions reviewed with pt. & spouse. Belongings sent with pt. Patient discharged to home.   Core Measures and Vaccines  Core Measures applicable during stay: No.  Pneumonia and Influenza given prior to discharge, if indicated: No  Surgical Patient   Surgical Procedures during stay: yes, primary   Did patient receive discharge instruction on wound care and recognition of infection symptoms? Yes  MISC  Follow up appointment made:  Yes  Home and hospital aquired medications returned to patient: N/A  Patient reports pain was well managed at discharge: Yes

## 2021-03-31 NOTE — PLAN OF CARE
Assessments as charted. B/P: 114/58, T: 98.7, P: 97, R: 16. Rates pain: 1-5/10 pt reports adequate pain control. Incision: Healing well, well approximated, without signs of infection, and no drainage. Voiding without difficulty. Fundus firm at U-1. Lochia: Light. Activity:  slightly limited due to incisional pain . Infant feeding: Breast feeding going well.     LATCH Score:   Latch: 2 - Good Latch  Audible Swallowin - Spontaneous & frequent  Type of Nipple: (Breast/Nipple) 2 - Everted  Comfort:1  Hold: 2 - No Assist   Total LATCH Score: 9    Postpartum breastfeeding assessment completed and education provided, see Patient Education Activity.  Items included in the education are:   proper positioning and latch  effectiveness of feeding  manual expression  handling and storing breastmilk  maintenance of breastfeeding for the first 6 months  sign/symptoms of infant feeding issues requiring referral to qualified health care provider  Postpartum care education provided, see Patient Education activity. Patient denies needs. Will monitor.  Pinky Liu RN

## 2021-03-31 NOTE — DISCHARGE SUMMARY
[facility]    Discharge Summary  Obstetrics    Date of Admission:  3/29/2021  Date of Discharge:  3/31/2021  Discharging Provider: Joey Alvarez    Discharge Diagnoses   IUP 41w 2d  Breech presentation  Early labor  Cat II FHT's  Nonreactive NST  Primary  Section   3/29/2021      Procedure/Surgery Information   Procedure: Procedure(s):   SECTION   Surgeon(s): Surgeon(s) and Role:     * Frow, David Franke, MD - Primary     History of Present Illness   Ambreen Flores is a 25 year old female who presented with breech presentation and contractions.    Hospital Course   The patient's hospital course was unremarkable.  She recovered as anticipated and experienced no post-operative complications.  On discharge, her pain was well controlled. Vaginal bleeding is similar to peak menstrual flow.  Voiding without difficulty.  Ambulating well and tolerating a normal diet.  No fever or significant wound drainage.  Breastfeeding well.  Infant is stable.  She was discharged on post-partum day # 2.    Post-partum hemoglobin:   Hemoglobin   Date Value Ref Range Status   2021 11.6 (L) 11.7 - 15.7 g/dL Final       Joey Alvarez MD    Discharge Disposition   Discharged to home   Condition at discharge: Good    Pending Results   Final pathology results: No pathology submitted    Unresulted Labs Ordered in the Past 30 Days of this Admission     Date and Time Order Name Status Description    3/29/2021 191 Treponema Abs w Reflex to RPR and Titer In process           Primary Care Physician   Shanna Montague    Consultations This Hospital Stay   HOME CARE POST PARTUM/ IP CONSULT  LACTATION IP CONSULT    Discharge Orders      Activity    Review discharge instructions     Reason for your hospital stay    Maternity care     Discharge Instructions - Postpartum visit    Clips out Monday in women's clinic.  Dr Pratt to schedule her visits.     Diet    Resume previous diet     Discharge Medications    Current Discharge Medication List      START taking these medications    Details   ibuprofen (ADVIL/MOTRIN) 800 MG tablet Take 1 tablet (800 mg) by mouth every 8 hours as needed for pain Take with a full meal for primary pain control.  Qty: 50 tablet, Refills: 1    Associated Diagnoses: Single liveborn infant, delivered by       oxyCODONE-acetaminophen (PERCOCET) 5-325 MG tablet Take 1 tablet by mouth every 6 hours as needed for pain Take with food as a supplement to Ibuprofen for secondary pain control  Qty: 25 tablet, Refills: 0    Associated Diagnoses: Single liveborn infant, delivered by          CONTINUE these medications which have NOT CHANGED    Details   Prenatal Vit-Fe Fumarate-FA (PRENATAL MULTIVITAMIN W/IRON) 27-0.8 MG tablet Take 1 tablet by mouth daily           Allergies   Allergies   Allergen Reactions     Seasonal Allergies                  DISCHARGE PHYSICAL EXAMINATION       WDF in NAD  VSS  Afebrile   Hgb stable 11.6  Lungs clear  Cardiac RR  Abdomen soft, surgical tenderness, fundus firm, flow scant  Incision clean, dry, healing well  Extremities normal  Neuro normal  Reflexes 2+, equal, symmetric, without clonus  Urine output is good and clear      Exam is stable for discharge

## 2021-03-31 NOTE — PLAN OF CARE
Face to face report given with opportunity to observe patient.    Report given to Teena Liu RN   3/31/2021  7:52 AM

## 2021-04-05 ENCOUNTER — HOSPITAL ENCOUNTER (OUTPATIENT)
Dept: OBGYN | Facility: HOSPITAL | Age: 26
End: 2021-04-05
Attending: OBSTETRICS & GYNECOLOGY
Payer: OTHER GOVERNMENT

## 2021-04-05 ENCOUNTER — OFFICE VISIT (OUTPATIENT)
Dept: OBGYN | Facility: OTHER | Age: 26
End: 2021-04-05
Attending: OBSTETRICS & GYNECOLOGY
Payer: OTHER GOVERNMENT

## 2021-04-05 VITALS
TEMPERATURE: 97.6 F | SYSTOLIC BLOOD PRESSURE: 112 MMHG | DIASTOLIC BLOOD PRESSURE: 76 MMHG | HEIGHT: 66 IN | WEIGHT: 164 LBS | BODY MASS INDEX: 26.36 KG/M2

## 2021-04-05 PROCEDURE — G0463 HOSPITAL OUTPT CLINIC VISIT: HCPCS

## 2021-04-05 PROCEDURE — 99207 PR NO CHARGE LOS: CPT | Performed by: OBSTETRICS & GYNECOLOGY

## 2021-04-05 ASSESSMENT — MIFFLIN-ST. JEOR: SCORE: 1505.65

## 2021-04-05 ASSESSMENT — PAIN SCALES - GENERAL: PAINLEVEL: NO PAIN (0)

## 2021-04-05 NOTE — LACTATION NOTE
Follow-up Lactation Consultation    Ambreen Flores                                                                                                   [id number]      Consultation Date: 2021     Reason for Lactation Referral: weight and latch check    Baby's : 3/29/21    Primary Care Provider: Dr. Pratt for both    History of Present Illness: Ambreen presents with 7 day old Felton and her . Ambreen states her milk came in a few days ago. She has also been pumping 2-3 times per day with a total of 10oz pumped per day. Felton is latching quickly, and sometimes it is painful. Ambreen denies cracks, blisters or bleeding of nipples. He has audible swallows, is content after feeding. He burps well. He has spit up some after feeding. He has 6+ wet, and 4+ stools each day. Stools are now yellow and seedy. Ambreen has questions about pumping and latch.    MATERNAL HISTORY   History of Breast Surgery: No  Breast Changes During Pregnancy: Yes  Breast Feeding History: No  Maternal Meds: daily prenatal vitamin, ibuprofen    MATERNAL ASSESSMENT    Breast Size: average, symmetrical, soft after feeding and filling prior to feeding  Nipple Appearance - Left: intact  Nipple Appearance - Right: intact  Nipple Erectility - Left: erect with stimulation  Nipple Erectility - Right: erect with stimulation  Areolas Compressibility: soft  Nipple Size: average  Milk Supply: mature    INFANT ASSESSMENT    Oral Anatomy  Mouth: normal  Palate: normal  Jaw: normal  Tongue: normal  Frenulum: normal     FEEDING   Feeding Time: 0920 for 9 minutes  Position: left breast, right breast, modified cradle  Effort to Latch: awake and alert, latched easily  Duration of Breast Feeding: Right Breast: 4; Left Breast: 5  Results: excellent breast feed    Volume of Intake:    Birth Weight: 8lb 14oz    Discharge from Hospital after delivery weight: 8lb 6oz    TODAY 2021    Pre-Weight: 9lb 7.3oz with outfit     Post-Weight: 9lb  11.7oz    Total Intake: 125ml  Output: 1 seedy mustard-seed yellow with notable void diaper changed before and after breastfeeding session    LATCH Score:   Latch: 2 - Good Latch  Audible Swallowin - Spontaneous & frequent  Type of Nipple: (Breast/Nipple) 2 - Everted  Comfort: 2 - Soft, Nontender  Hold: 1 - Min. Assist   Total LATCH Score: 9    FEEDING PLAN    Home Feeding Plan: Continue to feed on demand when  elicits feeding cues with deep latch.  Exclusivity explained and encouraged in the early weeks to establish breastfeeding and order in milk supply.  Continue to apply expressed breast milk and Lanolin cream to nipples after feedings for healing and comfort.  Postpartum breastfeeding assessment completed and education provided.  Items included in the education are:     proper positioning and latch    effectiveness of feeding    manual expression    handling and storing breastmilk    maintenance of breastfeeding for the first 6 months    sign/symptoms of infant feeding issues requiring referral to qualified health care provider    LACTATION COMMENTS   Deep latch explained for proper positioning of breast in infant's mouth, maximizing milk transfer and comfort.  Gently pull chin down if latch is pinchy, or relatch with bottom lip well below the nipple.  Hand expression reviewed and return demonstration observed with mature milk present.  Reassurance and encouragement provided in regard to mom's concerns about milk supply.  Follow-up support information provided.  Parents plan to keep [place] Well-Child Check with Dr Pratt on Wednesday for further support and monitoring.      Face-to-face Time: 50 minutes with assessment and education.    OSCAR BURKS RN, IBCLC  2021  9:10 AM

## 2021-04-05 NOTE — PROGRESS NOTES
"POSTPARTUM INCISION CHECK    SUBJECTIVE=   25 Y.O G 1  POD # 7   PRIMARY CSECTION   HX FETAL ALCOHOL SYND/ DEPRESSION/ ADOPTION HX [ SELF]   REPORTS PAIN CONTROLLED WITH IBUPROFEN ONLY [ 0/10 THIS MORNING ]   NO NAUSEA/KDI5OSS/ DIARRHEA/ SOB / HEADACHE / VISUAL CHANGES.   DIET TOLERATED   BLEEDING REPORT SCANT AND RESOLVING   BABY IS WELL   SHE HAS A BREAST FEEDING APPOINTMENT IN L&D THIS MORNING.    OBJECTIVE=    Vitals:    21 0828   BP: 112/76   Temp: 97.6  F (36.4  C)   Weight: 74.4 kg (164 lb)   Height: 1.676 m (5' 6\")     Oriented x3  Heent= perrla, eomi  Neck no lad/thmheart rrr normal s1s2  Lungs= cta bilaterally  Back= no cva tenderness  Abd= soft, approp, inc= c/d/i staples in place  Ext= no c/c/e x4  Dtr= 1/4 @ patella and equal      ASSESSMENT=   25  POD #7, DOING WELL, NO SSX'S OF DEPRESSION ,NO SSX'S OF INFECTION OR HEMORRHAGE    1. INCISION CHECK= NO INFECTION, WELL APPROXIMATED. STAPLES REMOVED AND STRISTRIPS PLACED    2. DEPRESSION COUNSELING-- NO SSX'S.    3. BLEEDING AND INFECTION PRECAUTIONS DISCUSSED.    4. FAMILY PLANNING COUNSELING TO INCLUDE RISK OF PREGNANCY DURING BREAST FEEDING- PAT ELECTS TO WAIT UNTIL 6 WEEK POSTPARTUM CHECK BEFORE PRESCRIPTION .    5. RTC IN 1 MONTH.    6. APPRECIATE THE OPPORTUNITY TO ASSIST WITH THIS PLEASANT YOUNG WOMAN AND HER FAMILY.  "

## 2021-04-05 NOTE — NURSING NOTE
"Chief Complaint   Patient presents with     Post-op Visit       Initial /76   Temp 97.6  F (36.4  C)   Ht 1.676 m (5' 6\")   Wt 74.4 kg (164 lb)   BMI 26.47 kg/m   Estimated body mass index is 26.47 kg/m  as calculated from the following:    Height as of this encounter: 1.676 m (5' 6\").    Weight as of this encounter: 74.4 kg (164 lb).  Medication Reconciliation: complete  Britta Dunbar LPN    "

## 2021-04-05 NOTE — PATIENT INSTRUCTIONS
FEEDING   Feeding Time: 0920 for 9 minutes  Position: left breast, right breast, modified cradle  Effort to Latch: awake and alert, latched easily  Duration of Breast Feeding: Right Breast: 4; Left Breast: 5  Results: excellent breast feed    Volume of Intake:    Birth Weight: 8lb 14oz    Discharge from Hospital after delivery weight: 8lb 6oz    TODAY 2021    Pre-Weight: 9lb 7.3oz with outfit     Post-Weight: 9lb 11.7oz    Total Intake: 125ml  Output: 1 seedy mustard-seed yellow with notable void diaper changed before breastfeeding session    LATCH Score:   Latch: 2 - Good Latch  Audible Swallowin - Spontaneous & frequent  Type of Nipple: (Breast/Nipple) 2 - Everted  Comfort: 2 - Soft, Nontender  Hold: 1 - Min. Assist   Total LATCH Score: 9    FEEDING PLAN    Home Feeding Plan: Continue to feed on demand when  elicits feeding cues with deep latch.  Exclusivity explained and encouraged in the early weeks to establish breastfeeding and order in milk supply.  Continue to apply expressed breast milk and Lanolin cream to nipples after feedings for healing and comfort.  Postpartum breastfeeding assessment completed and education provided.  Items included in the education are:     proper positioning and latch    effectiveness of feeding    manual expression    handling and storing breastmilk    maintenance of breastfeeding for the first 6 months    sign/symptoms of infant feeding issues requiring referral to qualified health care provider

## 2021-04-25 ENCOUNTER — HEALTH MAINTENANCE LETTER (OUTPATIENT)
Age: 26
End: 2021-04-25

## 2021-04-28 RX ORDER — IBUPROFEN 800 MG/1
TABLET, FILM COATED ORAL
Qty: 50 TABLET | Refills: 1 | Status: SHIPPED | OUTPATIENT
Start: 2021-04-28 | End: 2022-06-21

## 2021-04-28 NOTE — TELEPHONE ENCOUNTER
Failed protoco    No results found for: CR     No results found for: ALT.ast  No results found for: AST     Lab Results   Component Value Date    WBC 13.7 03/29/2021     Lab Results   Component Value Date    RBC 4.36 03/29/2021     Lab Results   Component Value Date    HGB 11.6 03/30/2021     Lab Results   Component Value Date    HCT 40.6 03/29/2021     No components found for: MCT  Lab Results   Component Value Date    MCV 93 03/29/2021     Lab Results   Component Value Date    MCH 31.0 03/29/2021     Lab Results   Component Value Date    MCHC 33.3 03/29/2021     Lab Results   Component Value Date    RDW 13.7 03/29/2021     Lab Results   Component Value Date     03/29/2021     ibuprofen (ADVIL/MOTRIN) 800 MG tablet      Last Written Prescription Date:  3/31/21  Last Fill Quantity: 50,   # refills: 1  Last Office Visit: 3/29/21  Future Office visit:       Routing refill request to provider for review/approval because:  Drug not on the G, P or [facility] refill protocol or controlled substance

## 2021-10-10 ENCOUNTER — HEALTH MAINTENANCE LETTER (OUTPATIENT)
Age: 26
End: 2021-10-10

## 2021-10-14 ENCOUNTER — OFFICE VISIT (OUTPATIENT)
Dept: CHIROPRACTIC MEDICINE | Facility: OTHER | Age: 26
End: 2021-10-14
Attending: CHIROPRACTOR
Payer: OTHER GOVERNMENT

## 2021-10-14 DIAGNOSIS — M99.01 SEGMENTAL AND SOMATIC DYSFUNCTION OF CERVICAL REGION: ICD-10-CM

## 2021-10-14 DIAGNOSIS — M99.03 SEGMENTAL AND SOMATIC DYSFUNCTION OF LUMBAR REGION: Primary | ICD-10-CM

## 2021-10-14 DIAGNOSIS — M99.02 SEGMENTAL AND SOMATIC DYSFUNCTION OF THORACIC REGION: ICD-10-CM

## 2021-10-14 DIAGNOSIS — M54.50 ACUTE BILATERAL LOW BACK PAIN WITHOUT SCIATICA: ICD-10-CM

## 2021-10-14 PROCEDURE — 98941 CHIROPRACT MANJ 3-4 REGIONS: CPT | Mod: AT | Performed by: CHIROPRACTOR

## 2021-10-14 NOTE — PROGRESS NOTES
Subjective Finding:    Chief compalint: Patient presents with:  Back Pain  , Pain Scale: 5/10, Intensity: sharp, Duration: 3 weeks, Radiating: no.    Date of injury:     Activities that the pain restricts:   Home/household/hobbies/social activities: yes.  Work duties: yes.  Sleep: no.  Makes symptoms better: rest.  Makes symptoms worse: lumbar extension and lumbar flexion.  Have you seen anyone else for the symptoms? No.  Work related: no.  Automobile related injury: no.    Objective and Assessment:    Posture Analysis:   High shoulder: .  Head tilt: .  High iliac crest: .  Head carriage: neutral.  Thoracic Kyphosis: neutral.  Lumbar Lordosis: forward.    Lumbar Range of Motion: extension decreased.  Cervical Range of Motion: .  Thoracic Range of Motion: .  Extremity Range of Motion: .    Palpation:   Quad lumb: bilateral, referred pain: no    Segmental dysfunction pre-treatment and treatment area: C4, T4, T6, L4 and L5.    Assessment post-treatment:  Cervical: ROM increased.  Thoracic: ROM increased.  Lumbar: ROM increased.    Comments: scoliotic curve in T spine.      Complicating Factors: structural abnormalities.    Procedure(s):  CMT:  79176 Chiropractic manipulative treatment 3-4 regions performed   Cervical: Diversified, See above for level, Supine, Thoracic: Diversified, See above for level, Prone and Lumbar: Diversified, See above for level, Side posture    Modalities:  None performed this visit    Therapeutic procedures:  None    Plan:  Treatment plan: 2 times per week for 1 weeks.  Instructed patient: stretch as instructed at visit.  Short term goals: increase ROM.  Long term goals: restore normal function.  Prognosis: good.

## 2022-03-03 ENCOUNTER — TRANSFERRED RECORDS (OUTPATIENT)
Dept: HEALTH INFORMATION MANAGEMENT | Facility: CLINIC | Age: 27
End: 2022-03-03

## 2022-03-18 ENCOUNTER — TRANSFERRED RECORDS (OUTPATIENT)
Dept: HEALTH INFORMATION MANAGEMENT | Facility: CLINIC | Age: 27
End: 2022-03-18

## 2022-03-31 ENCOUNTER — TRANSFERRED RECORDS (OUTPATIENT)
Dept: HEALTH INFORMATION MANAGEMENT | Facility: CLINIC | Age: 27
End: 2022-03-31

## 2022-03-31 ENCOUNTER — TRANSFERRED RECORDS (OUTPATIENT)
Dept: HEALTH INFORMATION MANAGEMENT | Facility: HOSPITAL | Age: 27
End: 2022-03-31

## 2022-03-31 LAB
HEP C HIM: NORMAL
HIV 1&2 EXT: NORMAL

## 2022-05-21 ENCOUNTER — HEALTH MAINTENANCE LETTER (OUTPATIENT)
Age: 27
End: 2022-05-21

## 2022-05-23 ENCOUNTER — TRANSFERRED RECORDS (OUTPATIENT)
Dept: HEALTH INFORMATION MANAGEMENT | Facility: HOSPITAL | Age: 27
End: 2022-05-23

## 2022-05-30 NOTE — LACTATION NOTE
"Initial Lactation Consultation    Ambreen Flores                                                                                                    [id number]    Consultation Date: 3/30/2021    Reason for Lactation Referral:routine lactation assessment.    MATERNAL HISTORY   Maternal History:  section d/t breech, first baby  History of Breast Surgery: No  Breast Changes During Pregnancy: Yes  Breast Feeding History: No  Maternal Meds: see eMar    MATERNAL ASSESSMENT    Breast Size: average  Nipple Appearance - Left: intact  Nipple Appearance - Right: intact  Nipple Erectility - Left: erect with stimulation  Nipple Erectility - Right: erect with stimulation  Areolas Compressibility: soft  Nipple Size: average  Milk Supply: colostrum    INFANT ASSESSMENT    Oral Anatomy  Mouth: normal  Palate: normal  Jaw: normal  Tongue: normal  Frenulum: normal    FEEDING   Feeding Time:1300  Position: right breast, cradle  Effort to Latch: awake and alert, latched easily  Results: good breast feed    FEEDING PLAN    Inpatient Feeding Plan: Nurse on demand, responding to infant's feeding cues. Snuggle in skin-to-skin to learn positioning and infant cues. Rooming-in encouraged.    LACTATION COMMENTS   Anticipatory guidance provided in regard to \"baby's second night.\"    Link provided for Maximizing Milk Production at [facility] by Dr. Rama Lockhart.    Deep latch explained for proper positioning of breast in infant's mouth, maximizing milk transfer and comfort.  Hand expression taught and return demonstration observed with colostrum present.   signs of satiety reviewed.  \"Ways to know that baby is getting enough\" discussed thoroughly.  Assembled and reviewed how to use their Spectra 2 double electric breast pump.  Follow-up support information provided.        __________________________________________________________________________________  OSCAR BURKS RN, IBCLC  3/30/2021      " Patient updated regarding POC.  Patient is Aox4, VSS on RA and telemetry.  Patient ambulatory, continent x2.  Patient made NPO at MN for scheduled EGD.  Patient received CHG bath and prepped for procedure.  NS infusing at 125 via midline.  No complications at this time.  Will continue to monitor.   0618-transport here to take patient for procedure.  [facility].  Patient off the floor.      Problem: Adult Inpatient Plan of Care  Goal: Plan of Care Review  Outcome: Ongoing, Progressing  Goal: Patient-Specific Goal (Individualized)  Outcome: Ongoing, Progressing  Goal: Absence of Hospital-Acquired Illness or Injury  Outcome: Ongoing, Progressing  Goal: Optimal Comfort and Wellbeing  Outcome: Ongoing, Progressing  Goal: Readiness for Transition of Care  Outcome: Ongoing, Progressing     Problem: Impaired Wound Healing  Goal: Optimal Wound Healing  Outcome: Ongoing, Progressing     Problem: Infection  Goal: Absence of Infection Signs and Symptoms  Outcome: Ongoing, Progressing

## 2022-06-21 ENCOUNTER — OFFICE VISIT (OUTPATIENT)
Dept: OBGYN | Facility: OTHER | Age: 27
End: 2022-06-21
Attending: OBSTETRICS & GYNECOLOGY
Payer: MEDICAID

## 2022-06-21 ENCOUNTER — PREP FOR PROCEDURE (OUTPATIENT)
Dept: OBGYN | Facility: OTHER | Age: 27
End: 2022-06-21

## 2022-06-21 VITALS
SYSTOLIC BLOOD PRESSURE: 108 MMHG | TEMPERATURE: 98.4 F | BODY MASS INDEX: 24.91 KG/M2 | HEIGHT: 66 IN | OXYGEN SATURATION: 98 % | HEART RATE: 91 BPM | DIASTOLIC BLOOD PRESSURE: 60 MMHG | WEIGHT: 155 LBS | RESPIRATION RATE: 16 BRPM

## 2022-06-21 DIAGNOSIS — Z98.891 PREVIOUS CESAREAN SECTION: Primary | ICD-10-CM

## 2022-06-21 DIAGNOSIS — O34.219 PREVIOUS CESAREAN DELIVERY, ANTEPARTUM CONDITION OR COMPLICATION: ICD-10-CM

## 2022-06-21 DIAGNOSIS — Z30.2 ENCOUNTER FOR FEMALE STERILIZATION PROCEDURE: ICD-10-CM

## 2022-06-21 DIAGNOSIS — Z34.80 PREGNANCY IN MULTIGRAVIDA: Primary | ICD-10-CM

## 2022-06-21 PROCEDURE — G0463 HOSPITAL OUTPT CLINIC VISIT: HCPCS

## 2022-06-21 PROCEDURE — 99213 OFFICE O/P EST LOW 20 MIN: CPT | Mod: 57 | Performed by: OBSTETRICS & GYNECOLOGY

## 2022-06-21 RX ORDER — CETIRIZINE HYDROCHLORIDE 10 MG/1
10 TABLET ORAL
COMMUNITY
Start: 2021-11-24

## 2022-06-21 ASSESSMENT — PAIN SCALES - GENERAL: PAINLEVEL: NO PAIN (0)

## 2022-06-21 NOTE — NURSING NOTE
"Chief Complaint   Patient presents with     Consult     Consult for Dr Noah Pratt for C Section, Previous C Section 3-       Initial /60 (BP Location: Left arm, Cuff Size: Adult Regular)   Pulse 91   Temp 98.4  F (36.9  C) (Tympanic)   Resp 16   Ht 1.676 m (5' 6\")   Wt 70.3 kg (155 lb)   LMP 01/19/2022 (Exact Date)   SpO2 98%   BMI 25.02 kg/m   Estimated body mass index is 25.02 kg/m  as calculated from the following:    Height as of this encounter: 1.676 m (5' 6\").    Weight as of this encounter: 70.3 kg (155 lb).  Medication Reconciliation: complete  Mirna Murphy LPN  "

## 2022-06-21 NOTE — PROGRESS NOTES
"CC:  Consult from Dr. Pratt for Repeat     HPI:   26 year old   .  Patient is currently 22weeks pregnant.    +fetal movement.  Denies abdominal pain, contractions or bleeding.  She had a prior  for breech  and desires repeat.  She has been counseled on TOLAC and declines.      Pregnancy complications:  None    Patients Prenatal records are available and reviewed at today's visit and otherwise unchanged.  US reviewed.  Ant placenta, not low, no previa.     Past Medical History:   Diagnosis Date     Fetal alcohol syndrome        Past Surgical History:   Procedure Laterality Date     APPENDECTOMY N/A     estimated date      SECTION N/A 3/29/2021    Procedure:  SECTION;  Surgeon: Frow, David Franke, MD;  Location: [facility] OR       Family History   Adopted: Yes   Family history unknown: Yes       Allergies: Seasonal allergies    Current Outpatient Medications   Medication Sig Dispense Refill     cetirizine (ZYRTEC) 10 MG tablet Take 10 mg by mouth       Prenatal Vit-Fe Fumarate-FA (PRENATAL MULTIVITAMIN W/IRON) 27-0.8 MG tablet Take 1 tablet by mouth daily         ROS:  CONSTITUTIONAL: NEGATIVE for fever, chills, HA  GI: NEGATIVE for nausea, abdominal pain, heartburn, or change in bowel habits  : NEGATIVE for , dysuria, hematuria, vaginal discharge          EXAM:  Blood pressure 108/60, pulse 91, temperature 98.4  F (36.9  C), temperature source Tympanic, resp. rate 16, height 1.676 m (5' 6\"), weight 70.3 kg (155 lb), last menstrual period 2022, SpO2 98 %, not currently breastfeeding.   BMI= Body mass index is 25.02 kg/m .  General - pleasant female in no acute distress.  Abdomen - soft, nontender, nondistended, gravid.  Musculoskeletal - no gross deformities.  Neurological - normal mental status.  Extremities:  No edema   + FHT's 147.  Abd soft, nt gravid.     ASSESSMENT/PLAN:  Intrauterine pregnancy 22 weeks pregnancy.  Prior CD, Desires repeat and permanent sterilzation.  "   Scheduled Repeat CD and bilateral salpingectomy 10/1922.  Federal sterilization request reviewed and signed.  Contraceptive alternatives discussed.   Reviewed goals, risks, alternatives of a  section including bleeding, infection, and potential damage to other organs including bowel, bladder, baby, blood vessels and nerves.  Irreversibility/regerte/menstrual changes discussed.   Hosptial stay and recovery period discussed.  All questions were answered. F/u appt scheduled 36-37 weeks for Preop and consent visit.

## 2022-08-18 ENCOUNTER — HOSPITAL ENCOUNTER (EMERGENCY)
Facility: HOSPITAL | Age: 27
End: 2022-08-18
Payer: MEDICAID

## 2022-08-18 ENCOUNTER — HOSPITAL ENCOUNTER (OUTPATIENT)
Facility: HOSPITAL | Age: 27
LOS: 1 days | Discharge: HOME | End: 2022-08-18
Attending: FAMILY MEDICINE | Admitting: FAMILY MEDICINE
Payer: MEDICAID

## 2022-08-18 VITALS
DIASTOLIC BLOOD PRESSURE: 56 MMHG | RESPIRATION RATE: 16 BRPM | OXYGEN SATURATION: 96 % | SYSTOLIC BLOOD PRESSURE: 113 MMHG | TEMPERATURE: 98.3 F

## 2022-08-18 DIAGNOSIS — Z36.89 ENCOUNTER FOR TRIAGE IN PREGNANT PATIENT: Primary | ICD-10-CM

## 2022-08-18 LAB
ALBUMIN UR-MCNC: NEGATIVE MG/DL
APPEARANCE UR: CLEAR
BILIRUB UR QL STRIP: NEGATIVE
COLOR UR AUTO: ABNORMAL
FLUAV RNA SPEC QL NAA+PROBE: NEGATIVE
FLUBV RNA RESP QL NAA+PROBE: NEGATIVE
GLUCOSE UR STRIP-MCNC: NEGATIVE MG/DL
HGB UR QL STRIP: NEGATIVE
KETONES UR STRIP-MCNC: 100 MG/DL
LEUKOCYTE ESTERASE UR QL STRIP: NEGATIVE
NITRATE UR QL: NEGATIVE
PH UR STRIP: 6 [PH] (ref 4.7–8)
RSV RNA SPEC NAA+PROBE: NEGATIVE
SARS-COV-2 RNA RESP QL NAA+PROBE: POSITIVE
SP GR UR STRIP: 1.02 (ref 1–1.03)
UROBILINOGEN UR STRIP-MCNC: NORMAL MG/DL

## 2022-08-18 PROCEDURE — 81003 URINALYSIS AUTO W/O SCOPE: CPT | Performed by: FAMILY MEDICINE

## 2022-08-18 PROCEDURE — 59025 FETAL NON-STRESS TEST: CPT

## 2022-08-18 PROCEDURE — 87637 SARSCOV2&INF A&B&RSV AMP PRB: CPT | Performed by: FAMILY MEDICINE

## 2022-08-18 RX ORDER — LIDOCAINE 40 MG/G
CREAM TOPICAL
Status: DISCONTINUED | OUTPATIENT
Start: 2022-08-18 | End: 2022-08-18 | Stop reason: HOSPADM

## 2022-08-18 ASSESSMENT — ACTIVITIES OF DAILY LIVING (ADL): ADLS_ACUITY_SCORE: 31

## 2022-08-18 NOTE — PLAN OF CARE
Goal Outcome Evaluation:              Ambreen Flores is a 26 year old  and 30w3d patient came in complaining of Headache and Fever    Patient Active Problem List   Diagnosis     Engagement of fetus in breech position     Single liveborn infant, delivered by --3/29/2021     Encounter for triage in pregnant patient       Pt discharged to home at 4:29 AM and encouraged to rest and drink plenty of fluids.  Pt told to call/return if bleeding more than spotting, water breaks, contractions 3-5 minutes apart that she has to breath through.     Nursing education on s/s labor, covid19, and paxlovid provided. Self-management instructions reviewed. New antiviral medication and covid care therapies reviewed. AVS given and signed. All questions answered and patient verbalizes understanding.    /56 (BP Location: Left arm)   Temp 98.3  F (36.8  C) (Oral)   Resp 16   LMP 2022 (Exact Date)   SpO2 96%     Cervical status: not examined  Fetal Assessment: Reactive    Discharge support:  Independent-Alone    OB History    Para Term  AB Living   2 1 1 0 0 1   SAB IAB Ectopic Multiple Live Births   0 0 0 0 1      # Outcome Date GA Lbr Adelfo/2nd Weight Sex Delivery Anes PTL Lv   2 Current            1 Term 21 41w2d  4.025 kg (8 lb 14 oz) M  Spinal N JANINA      Name: RHINA FLORES-AMBREEN      Apgar1: 8  Apgar5: 8       Mynor Beltran RN

## 2022-08-18 NOTE — DISCHARGE INSTRUCTIONS
Discharge Instructions for Undelivered Patients    Diet:  * Drink 8 to 12 glasses of liquids (milk, juice, water) every day  * You may eat meals and snacks.    Activity:  * Count fetal kicks every day.  * Call your doctor if your baby is moving less than usual.    Call your provider if you notice:  * Swelling in your face or increased swelling in your hands or legs.  * Headaches that are not relieved by Tylenol (acetaminophen).  * Changes in your vision (blurring; seeing spots or stars).  * Nausea (sick to your stomach) and vomiting (throwing up).  * Weight gain of 5 pounds per week.  * Heartburn that doesn't go away.  * Signs of bladder infection: Pain when you urinate (use the toilet), needing to go more often or more urgently.  * The bag of washburn (membrane) breaks, or you notice leaking in your underwear.  * Bright red blood in your underwear.  * Abdominal (lower belly) or stomach pain.  * For first baby: Contractions (tightenings) less than 5 minutes apart for one hour or more.  * Second (plus) baby: Contractions (tightenings) less than 10 minutes apart and getting stronger.  * Increase or change in vaginal discharge (note the color and amount).    ***    [facility]: [phone]   Discharge Instructions for Undelivered Patients    Diet:  * Drink 8 to 12 glasses of liquids (milk, juice, water) every day  * You may eat meals and snacks.    Activity:  * Count fetal kicks every day.  * Call your doctor if your baby is moving less than usual.    Call your provider if you notice:  * Swelling in your face or increased swelling in your hands or legs.  * Headaches that are not relieved by Tylenol (acetaminophen).  * Changes in your vision (blurring; seeing spots or stars).  * Nausea (sick to your stomach) and vomiting (throwing up).  * Weight gain of 5 pounds per week.  * Heartburn that doesn't go away.  * Signs of bladder infection: Pain when you urinate (use the toilet), needing to go more often  or more urgently.  * The bag of washburn (membrane) breaks, or you notice leaking in your underwear.  * Bright red blood in your underwear.  * Abdominal (lower belly) or stomach pain.* Second (plus) baby: Contractions (tightenings) less than 10 minutes apart and getting stronger.  * Increase or change in vaginal discharge (note the color and amount).    ***IF covid becomes severe be seen by MD or in ED  ***continue tylenol for pain  ***be sure to drink plenty of water  ***take RX as prescribed    [facility] [place]: [phone]

## 2022-08-18 NOTE — PLAN OF CARE
"OB Triage Note  Ambreen Flores  MRN: [id number]  Gestational Age: 30w3d      Ambreen Flores presents for headache since  Yesterday moring, fever since last evening reaching .4F. Current Temp 98.0. Pt has been taking tylenol. Also c/o mid-to loqwer bilateral back pain \"mostly near the spine.\"      States christos since denies contractions..  Rates pain at 5/10. Denies LOF, no bleeding. Dr Pratt notified of arrival and condition.  Oriented patient to surroundings. Call light within reach.     FHT: 135  NST Start Time:0219  NST Stop Time:0245  NST: Reactive with variable.  Uterine Assessment:Contractions: pt intermittently feeling jaime hansen. No ctx noted.     Plan:  -Initial NST, then fetal/uterine monitoring per MD/patient plan.  -Urine and covid sample  -Nursing education on s/s labor, infection provided.      Verified with second RN: flora HICKS RN    Covid test andr urine test sent to lab      Mynor Beltran RN  Goal Outcome Evaluation:                      "

## 2022-08-18 NOTE — PLAN OF CARE
Ambreen Flores        NST:  reactive  Start: 0219   Stop: 0245    Physician: Dr Noah Pratt  Reason For Test: HA/fever/back pain  Estimated Date of Delivery: Oct 24, 2022   Gestational Age: 30w3d     Verified with second RN: flora HICKS RN    Comments:  Variable noted. No contractions noted. Pt having jaime hansen at times. Pt comes in with fever, HA, and generalized mid-low back pain for last 20 hrs.       Mynor Beltran, RN    Goal Outcome Evaluation:

## 2022-09-18 ENCOUNTER — HEALTH MAINTENANCE LETTER (OUTPATIENT)
Age: 27
End: 2022-09-18

## 2022-10-04 ENCOUNTER — PRENATAL OFFICE VISIT (OUTPATIENT)
Dept: OBGYN | Facility: OTHER | Age: 27
End: 2022-10-04
Attending: OBSTETRICS & GYNECOLOGY
Payer: MEDICAID

## 2022-10-04 VITALS
HEIGHT: 66 IN | WEIGHT: 177.4 LBS | OXYGEN SATURATION: 97 % | DIASTOLIC BLOOD PRESSURE: 60 MMHG | BODY MASS INDEX: 28.51 KG/M2 | SYSTOLIC BLOOD PRESSURE: 102 MMHG | HEART RATE: 100 BPM

## 2022-10-04 DIAGNOSIS — Z30.2 ENCOUNTER FOR FEMALE STERILIZATION PROCEDURE: ICD-10-CM

## 2022-10-04 DIAGNOSIS — Z98.891 PREVIOUS CESAREAN SECTION: ICD-10-CM

## 2022-10-04 DIAGNOSIS — Z34.80 PREGNANCY IN MULTIGRAVIDA: Primary | ICD-10-CM

## 2022-10-04 PROCEDURE — 76815 OB US LIMITED FETUS(S): CPT | Performed by: OBSTETRICS & GYNECOLOGY

## 2022-10-04 PROCEDURE — 99213 OFFICE O/P EST LOW 20 MIN: CPT | Mod: 25 | Performed by: OBSTETRICS & GYNECOLOGY

## 2022-10-04 PROCEDURE — G0463 HOSPITAL OUTPT CLINIC VISIT: HCPCS

## 2022-10-04 ASSESSMENT — PAIN SCALES - GENERAL: PAINLEVEL: NO PAIN (0)

## 2022-10-04 NOTE — NURSING NOTE
"Chief Complaint   Patient presents with     Prenatal Care       Initial LMP 01/19/2022 (Exact Date)  Estimated body mass index is 25.02 kg/m  as calculated from the following:    Height as of 6/21/22: 1.676 m (5' 6\").    Weight as of 6/21/22: 70.3 kg (155 lb).  Medication Reconciliation: complete  Iza Marino RN    "

## 2022-10-05 NOTE — PROGRESS NOTES
No complaints, doing well.  Repeat CD/salpingectomy 10/19/22  OB US:  Breech, High ant placenta.  Nml fluid/movement.  Preop done. Desires sterilization, previously counseled.   H&P reviewed and unchanged from prenatal record.  Chest clear to auscultation.  CV regular rate and rhythm without murmurs.  Abdomen soft, nontender, gravid.  Extremities negative.  Reveiwed goals, risks, alternatives of a  section/salpingectomy  including bleeding, infection, and potential damage to other organs including bowel, bladder, baby, blood vessels and nerves, irreversibility and regret. .  Hosptial stay and recovery period discussed.  All questions were answered.   Toney Polanco MD

## 2022-10-12 ENCOUNTER — ANESTHESIA EVENT (OUTPATIENT)
Dept: SURGERY | Facility: HOSPITAL | Age: 27
End: 2022-10-12
Payer: MEDICAID

## 2022-10-12 ASSESSMENT — LIFESTYLE VARIABLES: TOBACCO_USE: 1

## 2022-10-12 NOTE — ANESTHESIA PREPROCEDURE EVALUATION
Anesthesia Pre-Procedure Evaluation    Patient: Ambreen Flores   MRN: [id number] : 1995        Procedure : Procedure(s):   SECTION, WITH BILATERAL SALPINGECTOMY          Past Medical History:   Diagnosis Date     Fetal alcohol syndrome       Past Surgical History:   Procedure Laterality Date     APPENDECTOMY N/A 2017    estimated date      SECTION N/A 3/29/2021    Procedure:  SECTION;  Surgeon: Frow, David Franke, MD;  Location: [facility] OR      Allergies   Allergen Reactions     Seasonal Allergies       Social History     Tobacco Use     Smoking status: Never     Smokeless tobacco: Never   Substance Use Topics     Alcohol use: Not Currently      Wt Readings from Last 1 Encounters:   10/04/22 80.5 kg (177 lb 6.4 oz)        Anesthesia Evaluation   Pt has had prior anesthetic. Type: General and Regional.    No history of anesthetic complications       ROS/MED HX  ENT/Pulmonary:     (+) tobacco use,     Neurologic: Comment: Fetal alcohol syndrome      Cardiovascular:  - neg cardiovascular ROS     METS/Exercise Tolerance: >4 METS    Hematologic:  - neg hematologic  ROS     Musculoskeletal:  - neg musculoskeletal ROS     GI/Hepatic:  - neg GI/hepatic ROS     Renal/Genitourinary:  - neg Renal ROS     Endo:  - neg endo ROS     Psychiatric/Substance Use:     (+) psychiatric history depression (adhd)     Infectious Disease:  - neg infectious disease ROS     Malignancy:  - neg malignancy ROS     Other: Comment: Repeat c/s           Physical Exam    Airway  airway exam normal      Mallampati: I   TM distance: > 3 FB   Neck ROM: full   Mouth opening: > 3 cm    Respiratory Devices and Support         Dental  no notable dental history         Cardiovascular   cardiovascular exam normal       Rhythm and rate: regular and normal     Pulmonary   pulmonary exam normal        breath sounds clear to auscultation           OUTSIDE LABS:  CBC:   Lab Results   Component Value Date    WBC 13.7 (H) 2021     HGB 11.6 (L) 03/30/2021    HGB 13.5 03/29/2021    HCT 40.6 03/29/2021     03/29/2021     BMP: No results found for: NA, POTASSIUM, CHLORIDE, CO2, BUN, CR, GLC  COAGS: No results found for: PTT, INR, FIBR  POC: No results found for: BGM, HCG, HCGS  HEPATIC: No results found for: ALBUMIN, PROTTOTAL, ALT, AST, GGT, ALKPHOS, BILITOTAL, BILIDIRECT, MARINA  OTHER: No results found for: PH, LACT, A1C, LAYLA, PHOS, MAG, LIPASE, AMYLASE, TSH, T4, T3, CRP, SED    Anesthesia Plan    ASA Status:  2   NPO Status:  NPO Appropriate    Anesthesia Type: Spinal.              Consents    Anesthesia Plan(s) and associated risks, benefits, and realistic alternatives discussed. Questions answered and patient/representative(s) expressed understanding.     - Discussed: Risks, Benefits and Alternatives for BOTH SEDATION and the PROCEDURE were discussed     - Discussed with:  Patient      - Extended Intubation/Ventilatory Support Discussed: No.      - Patient is DNR/DNI Status: No    Use of blood products discussed: No .     Postoperative Care    Pain management: Peripheral nerve block (Single Shot), IV analgesics.   PONV prophylaxis: Ondansetron (or other 5HT-3)     Comments:    Other Comments:  9/22/22    Discussed risks and benefits of spinal anesthetic with patient including itching, sore back, infection, hematoma, spinal headache, CV complications, nerve damage, inability to place, conversion to general anesthesia, loss of airway, and death. Pt wishes to proceed.             JOLYNN Noel CRNA

## 2022-10-18 ENCOUNTER — TELEPHONE (OUTPATIENT)
Dept: NURSING | Facility: CLINIC | Age: 27
End: 2022-10-18

## 2022-10-18 RX ORDER — TRANEXAMIC ACID 10 MG/ML
1 INJECTION, SOLUTION INTRAVENOUS ONCE
Status: CANCELLED | OUTPATIENT
Start: 2022-10-18 | End: 2022-10-18

## 2022-10-18 NOTE — TELEPHONE ENCOUNTER
Pt calling with questions about her scheduled time to report to hospital on 10/19/22 for .    Pt states no one has called her.      Msg routed to Provider, Dr. Polanco.    Pt will try calling hospital to see if they can advise her.    Rama Ventura RN, Nurse Advisor 5:32 PM 10/18/2022

## 2022-10-19 ENCOUNTER — HOSPITAL ENCOUNTER (INPATIENT)
Facility: HOSPITAL | Age: 27
LOS: 2 days | Discharge: HOME | End: 2022-10-21
Attending: OBSTETRICS & GYNECOLOGY | Admitting: OBSTETRICS & GYNECOLOGY
Payer: MEDICAID

## 2022-10-19 ENCOUNTER — ANESTHESIA (OUTPATIENT)
Dept: SURGERY | Facility: HOSPITAL | Age: 27
End: 2022-10-19
Payer: MEDICAID

## 2022-10-19 ENCOUNTER — APPOINTMENT (OUTPATIENT)
Dept: ULTRASOUND IMAGING | Facility: HOSPITAL | Age: 27
End: 2022-10-19
Attending: NURSE ANESTHETIST, CERTIFIED REGISTERED
Payer: MEDICAID

## 2022-10-19 LAB
ABO/RH(D): NORMAL
ANTIBODY SCREEN: NEGATIVE
ERYTHROCYTE [DISTWIDTH] IN BLOOD BY AUTOMATED COUNT: 13.3 % (ref 10–15)
HCT VFR BLD AUTO: 35 % (ref 35–47)
HGB BLD-MCNC: 12 G/DL (ref 11.7–15.7)
HOLD SPECIMEN: NORMAL
MCH RBC QN AUTO: 32.2 PG (ref 26.5–33)
MCHC RBC AUTO-ENTMCNC: 34.3 G/DL (ref 31.5–36.5)
MCV RBC AUTO: 94 FL (ref 78–100)
PLATELET # BLD AUTO: 204 10E3/UL (ref 150–450)
RBC # BLD AUTO: 3.73 10E6/UL (ref 3.8–5.2)
SPECIMEN EXPIRATION DATE: NORMAL
WBC # BLD AUTO: 8.9 10E3/UL (ref 4–11)

## 2022-10-19 PROCEDURE — 360N000076 HC SURGERY LEVEL 3, PER MIN: Performed by: OBSTETRICS & GYNECOLOGY

## 2022-10-19 PROCEDURE — 250N000011 HC RX IP 250 OP 636: Performed by: OBSTETRICS & GYNECOLOGY

## 2022-10-19 PROCEDURE — 370N000017 HC ANESTHESIA TECHNICAL FEE, PER MIN: Performed by: OBSTETRICS & GYNECOLOGY

## 2022-10-19 PROCEDURE — 58611 LIGATE OVIDUCT(S) ADD-ON: CPT | Performed by: OBSTETRICS & GYNECOLOGY

## 2022-10-19 PROCEDURE — 250N000011 HC RX IP 250 OP 636: Performed by: NURSE ANESTHETIST, CERTIFIED REGISTERED

## 2022-10-19 PROCEDURE — 258N000003 HC RX IP 258 OP 636: Performed by: OBSTETRICS & GYNECOLOGY

## 2022-10-19 PROCEDURE — 0UT70ZZ RESECTION OF BILATERAL FALLOPIAN TUBES, OPEN APPROACH: ICD-10-PCS | Performed by: OBSTETRICS & GYNECOLOGY

## 2022-10-19 PROCEDURE — 64488 TAP BLOCK BI INJECTION: CPT | Mod: XU | Performed by: NURSE ANESTHETIST, CERTIFIED REGISTERED

## 2022-10-19 PROCEDURE — 86850 RBC ANTIBODY SCREEN: CPT | Performed by: OBSTETRICS & GYNECOLOGY

## 2022-10-19 PROCEDURE — 86901 BLOOD TYPING SEROLOGIC RH(D): CPT | Performed by: OBSTETRICS & GYNECOLOGY

## 2022-10-19 PROCEDURE — 250N000011 HC RX IP 250 OP 636

## 2022-10-19 PROCEDURE — 250N000009 HC RX 250: Performed by: NURSE ANESTHETIST, CERTIFIED REGISTERED

## 2022-10-19 PROCEDURE — C9290 INJ, BUPIVACAINE LIPOSOME: HCPCS | Performed by: NURSE ANESTHETIST, CERTIFIED REGISTERED

## 2022-10-19 PROCEDURE — 250N000009 HC RX 250: Performed by: OBSTETRICS & GYNECOLOGY

## 2022-10-19 PROCEDURE — 59514 CESAREAN DELIVERY ONLY: CPT | Performed by: NURSE ANESTHETIST, CERTIFIED REGISTERED

## 2022-10-19 PROCEDURE — 86780 TREPONEMA PALLIDUM: CPT | Performed by: OBSTETRICS & GYNECOLOGY

## 2022-10-19 PROCEDURE — 88302 TISSUE EXAM BY PATHOLOGIST: CPT | Mod: TC | Performed by: OBSTETRICS & GYNECOLOGY

## 2022-10-19 PROCEDURE — 710N000010 HC RECOVERY PHASE 1, LEVEL 2, PER MIN: Performed by: OBSTETRICS & GYNECOLOGY

## 2022-10-19 PROCEDURE — 120N000001 HC R&B MED SURG/OB

## 2022-10-19 PROCEDURE — 59514 CESAREAN DELIVERY ONLY: CPT | Mod: AS | Performed by: NURSE PRACTITIONER

## 2022-10-19 PROCEDURE — 59514 CESAREAN DELIVERY ONLY: CPT | Performed by: OBSTETRICS & GYNECOLOGY

## 2022-10-19 PROCEDURE — 36415 COLL VENOUS BLD VENIPUNCTURE: CPT | Performed by: OBSTETRICS & GYNECOLOGY

## 2022-10-19 PROCEDURE — 85027 COMPLETE CBC AUTOMATED: CPT | Performed by: OBSTETRICS & GYNECOLOGY

## 2022-10-19 PROCEDURE — 272N000001 HC OR GENERAL SUPPLY STERILE: Performed by: OBSTETRICS & GYNECOLOGY

## 2022-10-19 PROCEDURE — 250N000013 HC RX MED GY IP 250 OP 250 PS 637: Performed by: OBSTETRICS & GYNECOLOGY

## 2022-10-19 PROCEDURE — 250N000025 HC SEVOFLURANE, PER MIN: Performed by: OBSTETRICS & GYNECOLOGY

## 2022-10-19 PROCEDURE — 250N000009 HC RX 250

## 2022-10-19 RX ORDER — DEXTROSE, SODIUM CHLORIDE, SODIUM LACTATE, POTASSIUM CHLORIDE, AND CALCIUM CHLORIDE 5; .6; .31; .03; .02 G/100ML; G/100ML; G/100ML; G/100ML; G/100ML
INJECTION, SOLUTION INTRAVENOUS CONTINUOUS
Status: DISCONTINUED | OUTPATIENT
Start: 2022-10-19 | End: 2022-10-20 | Stop reason: CLARIF

## 2022-10-19 RX ORDER — ONDANSETRON 4 MG/1
4 TABLET, ORALLY DISINTEGRATING ORAL EVERY 6 HOURS PRN
Status: DISCONTINUED | OUTPATIENT
Start: 2022-10-19 | End: 2022-10-21 | Stop reason: HOSPADM

## 2022-10-19 RX ORDER — OXYTOCIN/0.9 % SODIUM CHLORIDE 30/500 ML
340 PLASTIC BAG, INJECTION (ML) INTRAVENOUS CONTINUOUS PRN
Status: DISCONTINUED | OUTPATIENT
Start: 2022-10-19 | End: 2022-10-21 | Stop reason: HOSPADM

## 2022-10-19 RX ORDER — BISACODYL 10 MG
10 SUPPOSITORY, RECTAL RECTAL DAILY PRN
Status: DISCONTINUED | OUTPATIENT
Start: 2022-10-21 | End: 2022-10-21 | Stop reason: HOSPADM

## 2022-10-19 RX ORDER — OXYTOCIN/0.9 % SODIUM CHLORIDE 30/500 ML
340 PLASTIC BAG, INJECTION (ML) INTRAVENOUS CONTINUOUS PRN
Status: DISCONTINUED | OUTPATIENT
Start: 2022-10-19 | End: 2022-10-19 | Stop reason: HOSPADM

## 2022-10-19 RX ORDER — NALBUPHINE HYDROCHLORIDE 10 MG/ML
2.5-5 INJECTION, SOLUTION INTRAMUSCULAR; INTRAVENOUS; SUBCUTANEOUS EVERY 6 HOURS PRN
Status: DISCONTINUED | OUTPATIENT
Start: 2022-10-19 | End: 2022-10-21 | Stop reason: HOSPADM

## 2022-10-19 RX ORDER — OXYTOCIN 10 [USP'U]/ML
10 INJECTION, SOLUTION INTRAMUSCULAR; INTRAVENOUS
Status: DISCONTINUED | OUTPATIENT
Start: 2022-10-19 | End: 2022-10-21 | Stop reason: HOSPADM

## 2022-10-19 RX ORDER — CEFOXITIN SODIUM 2 G/50ML
2 INJECTION, SOLUTION INTRAVENOUS EVERY 6 HOURS
Status: COMPLETED | OUTPATIENT
Start: 2022-10-19 | End: 2022-10-19

## 2022-10-19 RX ORDER — NALOXONE HYDROCHLORIDE 0.4 MG/ML
0.4 INJECTION, SOLUTION INTRAMUSCULAR; INTRAVENOUS; SUBCUTANEOUS
Status: DISCONTINUED | OUTPATIENT
Start: 2022-10-19 | End: 2022-10-21 | Stop reason: HOSPADM

## 2022-10-19 RX ORDER — CITRIC ACID/SODIUM CITRATE 334-500MG
30 SOLUTION, ORAL ORAL
Status: COMPLETED | OUTPATIENT
Start: 2022-10-19 | End: 2022-10-19

## 2022-10-19 RX ORDER — DIPHENHYDRAMINE HCL 25 MG
25 CAPSULE ORAL EVERY 6 HOURS PRN
Status: DISCONTINUED | OUTPATIENT
Start: 2022-10-19 | End: 2022-10-21 | Stop reason: HOSPADM

## 2022-10-19 RX ORDER — HYDROCORTISONE 25 MG/G
CREAM TOPICAL 3 TIMES DAILY PRN
Status: DISCONTINUED | OUTPATIENT
Start: 2022-10-19 | End: 2022-10-21 | Stop reason: HOSPADM

## 2022-10-19 RX ORDER — METHYLERGONOVINE MALEATE 0.2 MG/ML
200 INJECTION INTRAVENOUS
Status: DISCONTINUED | OUTPATIENT
Start: 2022-10-19 | End: 2022-10-19 | Stop reason: HOSPADM

## 2022-10-19 RX ORDER — OXYTOCIN 10 [USP'U]/ML
10 INJECTION, SOLUTION INTRAMUSCULAR; INTRAVENOUS
Status: DISCONTINUED | OUTPATIENT
Start: 2022-10-19 | End: 2022-10-19 | Stop reason: HOSPADM

## 2022-10-19 RX ORDER — ALBUTEROL SULFATE 0.83 MG/ML
2.5 SOLUTION RESPIRATORY (INHALATION) EVERY 4 HOURS PRN
Status: DISCONTINUED | OUTPATIENT
Start: 2022-10-19 | End: 2022-10-19 | Stop reason: HOSPADM

## 2022-10-19 RX ORDER — AMOXICILLIN 250 MG
2 CAPSULE ORAL 2 TIMES DAILY
Status: DISCONTINUED | OUTPATIENT
Start: 2022-10-19 | End: 2022-10-21 | Stop reason: HOSPADM

## 2022-10-19 RX ORDER — BUPIVACAINE HYDROCHLORIDE 7.5 MG/ML
INJECTION, SOLUTION INTRASPINAL PRN
Status: DISCONTINUED | OUTPATIENT
Start: 2022-10-19 | End: 2022-10-19

## 2022-10-19 RX ORDER — PROCHLORPERAZINE MALEATE 10 MG
10 TABLET ORAL EVERY 6 HOURS PRN
Status: DISCONTINUED | OUTPATIENT
Start: 2022-10-19 | End: 2022-10-21 | Stop reason: HOSPADM

## 2022-10-19 RX ORDER — CEFAZOLIN SODIUM/WATER 2 G/20 ML
2 SYRINGE (ML) INTRAVENOUS SEE ADMIN INSTRUCTIONS
Status: DISCONTINUED | OUTPATIENT
Start: 2022-10-19 | End: 2022-10-19 | Stop reason: HOSPADM

## 2022-10-19 RX ORDER — SODIUM CHLORIDE, SODIUM LACTATE, POTASSIUM CHLORIDE, CALCIUM CHLORIDE 600; 310; 30; 20 MG/100ML; MG/100ML; MG/100ML; MG/100ML
INJECTION, SOLUTION INTRAVENOUS CONTINUOUS
Status: DISCONTINUED | OUTPATIENT
Start: 2022-10-19 | End: 2022-10-19 | Stop reason: HOSPADM

## 2022-10-19 RX ORDER — METOCLOPRAMIDE 10 MG/1
10 TABLET ORAL EVERY 6 HOURS PRN
Status: DISCONTINUED | OUTPATIENT
Start: 2022-10-19 | End: 2022-10-21 | Stop reason: HOSPADM

## 2022-10-19 RX ORDER — DIPHENHYDRAMINE HYDROCHLORIDE 50 MG/ML
25 INJECTION INTRAMUSCULAR; INTRAVENOUS EVERY 6 HOURS PRN
Status: DISCONTINUED | OUTPATIENT
Start: 2022-10-19 | End: 2022-10-21 | Stop reason: HOSPADM

## 2022-10-19 RX ORDER — OXYCODONE HYDROCHLORIDE 5 MG/1
5 TABLET ORAL EVERY 4 HOURS PRN
Status: DISCONTINUED | OUTPATIENT
Start: 2022-10-19 | End: 2022-10-21 | Stop reason: HOSPADM

## 2022-10-19 RX ORDER — LIDOCAINE 40 MG/G
CREAM TOPICAL
Status: DISCONTINUED | OUTPATIENT
Start: 2022-10-19 | End: 2022-10-19 | Stop reason: HOSPADM

## 2022-10-19 RX ORDER — LIDOCAINE 40 MG/G
CREAM TOPICAL
Status: DISCONTINUED | OUTPATIENT
Start: 2022-10-19 | End: 2022-10-20 | Stop reason: CLARIF

## 2022-10-19 RX ORDER — MISOPROSTOL 200 UG/1
400 TABLET ORAL
Status: DISCONTINUED | OUTPATIENT
Start: 2022-10-19 | End: 2022-10-19 | Stop reason: HOSPADM

## 2022-10-19 RX ORDER — KETOROLAC TROMETHAMINE 30 MG/ML
INJECTION, SOLUTION INTRAMUSCULAR; INTRAVENOUS PRN
Status: DISCONTINUED | OUTPATIENT
Start: 2022-10-19 | End: 2022-10-19

## 2022-10-19 RX ORDER — ONDANSETRON 2 MG/ML
INJECTION INTRAMUSCULAR; INTRAVENOUS PRN
Status: DISCONTINUED | OUTPATIENT
Start: 2022-10-19 | End: 2022-10-19

## 2022-10-19 RX ORDER — MISOPROSTOL 200 UG/1
400 TABLET ORAL
Status: DISCONTINUED | OUTPATIENT
Start: 2022-10-19 | End: 2022-10-21 | Stop reason: HOSPADM

## 2022-10-19 RX ORDER — OXYTOCIN/0.9 % SODIUM CHLORIDE 30/500 ML
PLASTIC BAG, INJECTION (ML) INTRAVENOUS PRN
Status: DISCONTINUED | OUTPATIENT
Start: 2022-10-19 | End: 2022-10-19

## 2022-10-19 RX ORDER — PHENYLEPHRINE HYDROCHLORIDE 10 MG/ML
INJECTION INTRAVENOUS PRN
Status: DISCONTINUED | OUTPATIENT
Start: 2022-10-19 | End: 2022-10-19

## 2022-10-19 RX ORDER — METOCLOPRAMIDE HYDROCHLORIDE 5 MG/ML
10 INJECTION INTRAMUSCULAR; INTRAVENOUS EVERY 6 HOURS PRN
Status: DISCONTINUED | OUTPATIENT
Start: 2022-10-19 | End: 2022-10-21 | Stop reason: HOSPADM

## 2022-10-19 RX ORDER — TRANEXAMIC ACID 10 MG/ML
1 INJECTION, SOLUTION INTRAVENOUS EVERY 30 MIN PRN
Status: DISCONTINUED | OUTPATIENT
Start: 2022-10-19 | End: 2022-10-19 | Stop reason: HOSPADM

## 2022-10-19 RX ORDER — METHYLERGONOVINE MALEATE 0.2 MG/ML
200 INJECTION INTRAVENOUS
Status: DISCONTINUED | OUTPATIENT
Start: 2022-10-19 | End: 2022-10-21 | Stop reason: HOSPADM

## 2022-10-19 RX ORDER — CEFAZOLIN SODIUM/WATER 2 G/20 ML
2 SYRINGE (ML) INTRAVENOUS
Status: COMPLETED | OUTPATIENT
Start: 2022-10-19 | End: 2022-10-19

## 2022-10-19 RX ORDER — ACETAMINOPHEN 325 MG/1
975 TABLET ORAL ONCE
Status: COMPLETED | OUTPATIENT
Start: 2022-10-19 | End: 2022-10-19

## 2022-10-19 RX ORDER — MODIFIED LANOLIN
OINTMENT (GRAM) TOPICAL
Status: DISCONTINUED | OUTPATIENT
Start: 2022-10-19 | End: 2022-10-21 | Stop reason: HOSPADM

## 2022-10-19 RX ORDER — OXYTOCIN 10 [USP'U]/ML
10 INJECTION, SOLUTION INTRAMUSCULAR; INTRAVENOUS
Status: DISCONTINUED | OUTPATIENT
Start: 2022-10-19 | End: 2022-10-20 | Stop reason: CLARIF

## 2022-10-19 RX ORDER — TRANEXAMIC ACID 10 MG/ML
1 INJECTION, SOLUTION INTRAVENOUS EVERY 30 MIN PRN
Status: DISCONTINUED | OUTPATIENT
Start: 2022-10-19 | End: 2022-10-21 | Stop reason: HOSPADM

## 2022-10-19 RX ORDER — KETOROLAC TROMETHAMINE 30 MG/ML
30 INJECTION, SOLUTION INTRAMUSCULAR; INTRAVENOUS EVERY 6 HOURS
Status: COMPLETED | OUTPATIENT
Start: 2022-10-19 | End: 2022-10-20

## 2022-10-19 RX ORDER — OXYTOCIN/0.9 % SODIUM CHLORIDE 30/500 ML
100-340 PLASTIC BAG, INJECTION (ML) INTRAVENOUS CONTINUOUS PRN
Status: DISCONTINUED | OUTPATIENT
Start: 2022-10-19 | End: 2022-10-20 | Stop reason: CLARIF

## 2022-10-19 RX ORDER — BUPIVACAINE HYDROCHLORIDE 5 MG/ML
INJECTION, SOLUTION EPIDURAL; INTRACAUDAL PRN
Status: DISCONTINUED | OUTPATIENT
Start: 2022-10-19 | End: 2022-10-19

## 2022-10-19 RX ORDER — AMOXICILLIN 250 MG
1 CAPSULE ORAL 2 TIMES DAILY
Status: DISCONTINUED | OUTPATIENT
Start: 2022-10-19 | End: 2022-10-21 | Stop reason: HOSPADM

## 2022-10-19 RX ORDER — EPHEDRINE SULFATE 50 MG/ML
5 INJECTION, SOLUTION INTRAMUSCULAR; INTRAVENOUS; SUBCUTANEOUS
Status: DISCONTINUED | OUTPATIENT
Start: 2022-10-19 | End: 2022-10-19 | Stop reason: HOSPADM

## 2022-10-19 RX ORDER — CARBOPROST TROMETHAMINE 250 UG/ML
250 INJECTION, SOLUTION INTRAMUSCULAR
Status: DISCONTINUED | OUTPATIENT
Start: 2022-10-19 | End: 2022-10-19 | Stop reason: HOSPADM

## 2022-10-19 RX ORDER — ONDANSETRON 2 MG/ML
4 INJECTION INTRAMUSCULAR; INTRAVENOUS EVERY 30 MIN PRN
Status: DISCONTINUED | OUTPATIENT
Start: 2022-10-19 | End: 2022-10-19 | Stop reason: HOSPADM

## 2022-10-19 RX ORDER — ACETAMINOPHEN 325 MG/1
975 TABLET ORAL EVERY 6 HOURS
Status: DISCONTINUED | OUTPATIENT
Start: 2022-10-19 | End: 2022-10-21 | Stop reason: HOSPADM

## 2022-10-19 RX ORDER — ONDANSETRON 2 MG/ML
4 INJECTION INTRAMUSCULAR; INTRAVENOUS EVERY 6 HOURS PRN
Status: DISCONTINUED | OUTPATIENT
Start: 2022-10-19 | End: 2022-10-21 | Stop reason: HOSPADM

## 2022-10-19 RX ORDER — PROCHLORPERAZINE 25 MG
25 SUPPOSITORY, RECTAL RECTAL EVERY 12 HOURS PRN
Status: DISCONTINUED | OUTPATIENT
Start: 2022-10-19 | End: 2022-10-21 | Stop reason: HOSPADM

## 2022-10-19 RX ORDER — CARBOPROST TROMETHAMINE 250 UG/ML
250 INJECTION, SOLUTION INTRAMUSCULAR
Status: DISCONTINUED | OUTPATIENT
Start: 2022-10-19 | End: 2022-10-21 | Stop reason: HOSPADM

## 2022-10-19 RX ORDER — NALOXONE HYDROCHLORIDE 0.4 MG/ML
0.2 INJECTION, SOLUTION INTRAMUSCULAR; INTRAVENOUS; SUBCUTANEOUS
Status: DISCONTINUED | OUTPATIENT
Start: 2022-10-19 | End: 2022-10-21 | Stop reason: HOSPADM

## 2022-10-19 RX ORDER — IBUPROFEN 800 MG/1
800 TABLET, FILM COATED ORAL EVERY 6 HOURS
Status: DISCONTINUED | OUTPATIENT
Start: 2022-10-20 | End: 2022-10-21 | Stop reason: HOSPADM

## 2022-10-19 RX ORDER — SIMETHICONE 80 MG
80 TABLET,CHEWABLE ORAL 4 TIMES DAILY PRN
Status: DISCONTINUED | OUTPATIENT
Start: 2022-10-19 | End: 2022-10-21 | Stop reason: HOSPADM

## 2022-10-19 RX ORDER — VITAMIN A ACETATE, .BETA.-CAROTENE, ASCORBIC ACID, CHOLECALCIFEROL, .ALPHA.-TOCOPHEROL ACETATE, DL-, THIAMINE MONONITRATE, RIBOFLAVIN, NIACINAMIDE, PYRIDOXINE HYDROCHLORIDE, FOLIC ACID, CYANOCOBALAMIN, CALCIUM CARBONATE, FERROUS FUMARATE, ZINC OXIDE, AND CUPRIC OXIDE 2000; 2000; 120; 400; 22; 1.84; 3; 20; 10; 1; 12; 200; 27; 25; 2 [IU]/1; [IU]/1; MG/1; [IU]/1; MG/1; MG/1; MG/1; MG/1; MG/1; MG/1; UG/1; MG/1; MG/1; MG/1; MG/1
1 TABLET ORAL DAILY
Status: DISCONTINUED | OUTPATIENT
Start: 2022-10-19 | End: 2022-10-21 | Stop reason: HOSPADM

## 2022-10-19 RX ORDER — ONDANSETRON 4 MG/1
4 TABLET, ORALLY DISINTEGRATING ORAL EVERY 30 MIN PRN
Status: DISCONTINUED | OUTPATIENT
Start: 2022-10-19 | End: 2022-10-19 | Stop reason: HOSPADM

## 2022-10-19 RX ADMIN — KETOROLAC TROMETHAMINE 30 MG: 30 INJECTION, SOLUTION INTRAMUSCULAR; INTRAVENOUS at 14:52

## 2022-10-19 RX ADMIN — SODIUM CHLORIDE, SODIUM LACTATE, POTASSIUM CHLORIDE, CALCIUM CHLORIDE AND DEXTROSE MONOHYDRATE: 5; 600; 310; 30; 20 INJECTION, SOLUTION INTRAVENOUS at 11:22

## 2022-10-19 RX ADMIN — TRANEXAMIC ACID 1 G: 10 INJECTION, SOLUTION INTRAVENOUS at 08:36

## 2022-10-19 RX ADMIN — BUPIVACAINE HYDROCHLORIDE 10 ML: 5 INJECTION, SOLUTION EPIDURAL; INTRACAUDAL at 09:19

## 2022-10-19 RX ADMIN — KETOROLAC TROMETHAMINE 30 MG: 30 INJECTION, SOLUTION INTRAMUSCULAR; INTRAVENOUS at 21:27

## 2022-10-19 RX ADMIN — PROCHLORPERAZINE EDISYLATE 10 MG: 5 INJECTION INTRAMUSCULAR; INTRAVENOUS at 13:15

## 2022-10-19 RX ADMIN — PHENYLEPHRINE HYDROCHLORIDE 100 MCG: 10 INJECTION INTRAVENOUS at 08:31

## 2022-10-19 RX ADMIN — ONDANSETRON 4 MG: 2 INJECTION INTRAMUSCULAR; INTRAVENOUS at 10:34

## 2022-10-19 RX ADMIN — METOCLOPRAMIDE HYDROCHLORIDE 10 MG: 5 INJECTION INTRAMUSCULAR; INTRAVENOUS at 11:29

## 2022-10-19 RX ADMIN — SODIUM CHLORIDE, POTASSIUM CHLORIDE, SODIUM LACTATE AND CALCIUM CHLORIDE: 600; 310; 30; 20 INJECTION, SOLUTION INTRAVENOUS at 08:34

## 2022-10-19 RX ADMIN — ONDANSETRON 4 MG: 2 INJECTION INTRAMUSCULAR; INTRAVENOUS at 08:04

## 2022-10-19 RX ADMIN — Medication 2 G: at 07:44

## 2022-10-19 RX ADMIN — SODIUM CHLORIDE, SODIUM LACTATE, POTASSIUM CHLORIDE, CALCIUM CHLORIDE AND DEXTROSE MONOHYDRATE: 5; 600; 310; 30; 20 INJECTION, SOLUTION INTRAVENOUS at 19:02

## 2022-10-19 RX ADMIN — BUPIVACAINE HYDROCHLORIDE 10 ML: 5 INJECTION, SOLUTION EPIDURAL; INTRACAUDAL at 09:21

## 2022-10-19 RX ADMIN — OXYTOCIN-SODIUM CHLORIDE 0.9% IV SOLN 30 UNIT/500ML 250 ML: 30-0.9/5 SOLUTION at 08:36

## 2022-10-19 RX ADMIN — PHENYLEPHRINE HYDROCHLORIDE 100 MCG: 10 INJECTION INTRAVENOUS at 08:37

## 2022-10-19 RX ADMIN — ACETAMINOPHEN 975 MG: 325 TABLET, FILM COATED ORAL at 19:32

## 2022-10-19 RX ADMIN — ACETAMINOPHEN 975 MG: 325 TABLET, FILM COATED ORAL at 06:54

## 2022-10-19 RX ADMIN — BUPIVACAINE 10 ML: 13.3 INJECTION, SUSPENSION, LIPOSOMAL INFILTRATION at 09:19

## 2022-10-19 RX ADMIN — PHENYLEPHRINE HYDROCHLORIDE 150 MCG: 10 INJECTION INTRAVENOUS at 08:52

## 2022-10-19 RX ADMIN — CEFOXITIN SODIUM 2 G: 2 INJECTION, SOLUTION INTRAVENOUS at 19:33

## 2022-10-19 RX ADMIN — CEFOXITIN SODIUM 2 G: 2 INJECTION, SOLUTION INTRAVENOUS at 12:57

## 2022-10-19 RX ADMIN — SODIUM CHLORIDE, POTASSIUM CHLORIDE, SODIUM LACTATE AND CALCIUM CHLORIDE: 600; 310; 30; 20 INJECTION, SOLUTION INTRAVENOUS at 06:48

## 2022-10-19 RX ADMIN — Medication 100 ML/HR: at 10:08

## 2022-10-19 RX ADMIN — PHENYLEPHRINE HYDROCHLORIDE 100 MCG: 10 INJECTION INTRAVENOUS at 08:45

## 2022-10-19 RX ADMIN — SENNOSIDES AND DOCUSATE SODIUM 1 TABLET: 50; 8.6 TABLET ORAL at 19:33

## 2022-10-19 RX ADMIN — BUPIVACAINE HYDROCHLORIDE IN DEXTROSE 1.6 ML: 7.5 INJECTION, SOLUTION SUBARACHNOID at 08:12

## 2022-10-19 RX ADMIN — SODIUM CITRATE AND CITRIC ACID MONOHYDRATE 30 ML: 500; 334 SOLUTION ORAL at 07:17

## 2022-10-19 RX ADMIN — BUPIVACAINE 10 ML: 13.3 INJECTION, SUSPENSION, LIPOSOMAL INFILTRATION at 09:21

## 2022-10-19 RX ADMIN — OXYTOCIN-SODIUM CHLORIDE 0.9% IV SOLN 30 UNIT/500ML 100 ML: 30-0.9/5 SOLUTION at 08:53

## 2022-10-19 RX ADMIN — PHENYLEPHRINE HYDROCHLORIDE 50 MCG: 10 INJECTION INTRAVENOUS at 08:19

## 2022-10-19 RX ADMIN — KETOROLAC TROMETHAMINE 30 MG: 30 INJECTION, SOLUTION INTRAMUSCULAR at 09:00

## 2022-10-19 RX ADMIN — HYDROMORPHONE HYDROCHLORIDE 0.05 MG: 1 INJECTION, SOLUTION INTRAMUSCULAR; INTRAVENOUS; SUBCUTANEOUS at 08:12

## 2022-10-19 ASSESSMENT — ACTIVITIES OF DAILY LIVING (ADL)
ADLS_ACUITY_SCORE: 18

## 2022-10-19 NOTE — ANESTHESIA POSTPROCEDURE EVALUATION
Patient: Ambreen Flores    Procedure: Procedure(s):   SECTION, WITH BILATERAL SALPINGECTOMY viable baby girl at 0836       Anesthesia Type:  Spinal    Note:  Disposition: Inpatient   Postop Pain Control: Uneventful            Sign Out: Well controlled pain   PONV: No   Neuro/Psych: Uneventful            Sign Out: Acceptable/Baseline neuro status   Airway/Respiratory: Uneventful            Sign Out: Acceptable/Baseline resp. status   CV/Hemodynamics: Uneventful            Sign Out: Acceptable CV status; No obvious hypovolemia; No obvious fluid overload   Other NRE: NONE   DID A NON-ROUTINE EVENT OCCUR? No           Last vitals:  Vitals Value Taken Time   /65 10/19/22 1040   Temp 97.3  F (36.3  C) 10/19/22 0935   Pulse 85 10/19/22 1045   Resp 12 10/19/22 1045   SpO2 97 % 10/19/22 1045       Electronically Signed By: JOLYNN Noel CRNA  2022  12:15 PM

## 2022-10-19 NOTE — PLAN OF CARE
Scheduled AM C/S Admit Note  Ambreen Flores  MRN: [id number]  Gestational Age: 39w2d      Ambreen Flores presents for scheduled  section for repeat c/s.  Patient denies contractions, bleeding or LOF.  Last meal yesterday at 1800. Gatorade this morning at 0400    Past Medical History:   Diagnosis Date     Fetal alcohol syndrome      Past Surgical History:   Procedure Laterality Date     APPENDECTOMY N/A     estimated date      SECTION N/A 3/29/2021    Procedure:  SECTION;  Surgeon: Frow, David Franke, MD;  Location: [facility] OR           FHT: 140  NST: Reactive.    Plan:  - section at 0740.    Patient ambulated to bed via self.. Patient is alert and oriented X 3, denies any pain. pain. Patient oriented to room, unit, hourly rounding, and plan of care. Call light within reach.  Explained admission packet with patient bill of rights brochure. Will continue to monitor and document as needed.     Inpatient nursing criteria listed below was met:    Health care directives status obtained and documented: Yes  Patient identifies a surrogate decision maker: Leandra Flores   If yes, who:mother Contact Information:[phone]  Core Measure diagnosis present:: No  Vaccine assessment done and vaccines ordered if appropriate. Yes  Clergy visit ordered if patient requests: N/A  Skin issues/needs documented:N/A  Isolation needs addressed, if appropriate: N/A  Fall Prevention (Med and High risk): Care plan updated, Education given and documented and signage used: Yes  Care Plan initiated: Yes  Education Documented (Reminder to educate patient if MRSA is present on admission): Yes  Education Assessment documented:Yes  Patient has discharge needs (If yes, please explain): No

## 2022-10-19 NOTE — INTERVAL H&P NOTE
I have reviewed the surgical (or preoperative) H&P that is linked to this encounter, and examined the patient. There are no significant changes

## 2022-10-19 NOTE — PLAN OF CARE
Face to face report given with opportunity to observe patient.    Report given to Brian Jarrett RN   10/19/2022  7:04 AM

## 2022-10-19 NOTE — ANESTHESIA PROCEDURE NOTES
"Intrathecal injection Procedure Note    Pre-Procedure   Staff -        CRNA: Gee Meadows APRN CRNA       Other Anesthesia Staff: Karina Anderson       Performed By: CRNA and JN       Location: OR       Procedure Start/Stop Times: 10/19/2022 8:10 AM and 10/19/2022 8:12 AM       Pre-Anesthestic Checklist: patient identified, IV checked, risks and benefits discussed, informed consent, monitors and equipment checked, pre-op evaluation, at physician/surgeon's request and post-op pain management  Timeout:       Correct Patient: Yes        Correct Procedure: Yes        Correct Site: Yes        Correct Position: Yes   Procedure Documentation  Procedure: intrathecal injection       Patient Position: sitting       Skin prep: Chloraprep       Insertion Site: L4-5. (midline approach).       Needle Gauge: 25.        Needle Length (Inches): 3.5        Spinal Needle Type: Jacqui tip       Introducer: 20 G       # of attempts: 1 and  # of redirects:  0    Assessment/Narrative         Paresthesias: No.       CSF fluid: clear.    Medication(s) Administered   Medication Administration Time: 10/19/2022 8:10 AM      FOR [facility] ([facility] ONLY:   Pain Team Contact information: please page the Pain Team Via [employer]. Search \"Pain\". During daytime hours, please page the attending first. At night please page the resident first.    "

## 2022-10-19 NOTE — ANESTHESIA PROCEDURE NOTES
"TAP Procedure Note    Pre-Procedure   Staff -        CRNA: Gee Meadows APRN CRNA       Other Anesthesia Staff: Karina Anderson       Performed By: CRNA and JN       Location: OR       Procedure Start/Stop Times: 10/19/2022 9:18 AM and 10/19/2022 9:21 AM       Pre-Anesthestic Checklist: patient identified, IV checked, site marked, risks and benefits discussed, informed consent, monitors and equipment checked, pre-op evaluation, at physician/surgeon's request and post-op pain management  Timeout:       Correct Patient: Yes        Correct Procedure: Yes        Correct Site: Yes        Correct Position: Yes        Correct Laterality: Yes        Site Marked: Yes  Procedure Documentation  Procedure: TAP       Laterality: bilateral       Patient Position: supine       Skin prep: Chloraprep       Needle Type: insulated and short bevel       Needle Gauge: 20.        Needle Length (Inches): 6        Ultrasound guided       1. Ultrasound was used to identify targeted nerve, plexus, vascular marker, or fascial plane and place a needle adjacent to it in real-time.       2. Ultrasound was used to visualize the spread of anesthetic in close proximity to the above referenced structure.       3. A permanent image is entered into the patient's record.       4. The visualized anatomic structures appeared normal.       5. There were no apparent abnormal pathologic findings.    Assessment/Narrative         The placement was negative for: blood aspirated, painful injection and site bleeding       Paresthesias: No.       Bolus given via needle. no blood aspirated via catheter.        Secured via.        Insertion/Infusion Method: Single Shot       Complications: none    Medication(s) Administered   Medication Administration Time: 10/19/2022 9:18 AM      FOR [facility] ([facility]) ONLY:   Pain Team Contact information: please page the Pain Team Via [employer]. Search \"Pain\". During daytime hours, please page the attending first. At " night please page the resident first.

## 2022-10-19 NOTE — L&D DELIVERY NOTE
"OB  Delivery Note:      See separate  operative note for procedure details.       Ambreen Flores MRN# [id number]   Age: 26 year old YOB: 1995       GA: 39w2d  GP:   Labor Complications:    EBL:   mL  Delivery QBL:    Delivery Type:    ROM to Delivery Time: rupture date or rupture time have not been documented     1 Minute 5 Minute 10 Minute   Apgar Totals: 9   9               Mark, Female-Ambreen [[id number]]    Delivery/Placenta Date and Time    Delivery Date: 10/19/22 Delivery Time:  8:36 AM           Apgars    Living status: Living   1 Minute 5 Minute 10 Minute 15 Minute 20 Minute   Skin color: 1  1       Heart rate: 2  2       Reflex irritability: 2  2       Muscle tone: 2  2       Respiratory effort: 2  2       Total: 9  9       Apgars assigned by: JEWEL SPENCE RN     Cord    Vessels: 3 Vessels                    Resuscitation    [place] Care at Delivery: Delivery of viable baby girl via repeat c/s performed by Dr. Polanco. Baby placed on mom's abd, dried and bulb syringed. Baby to warmer by Dr. Pratt. Baby dried, warmed and stimulated. Apgars 9/9.   Output in Delivery Room: Voided      Measurements    Weight: 8 lb 1.1 oz Length: 1' 8\"   Head circumference: 36 cm Chest circumference: 36 cm   Abdominal girth: 34 cm  Output in delivery room: Voided     Skin to Skin and Feeding Plan    Skin to skin initiation date/time: 1/10/1841    Skin to skin with: Mother  Skin to skin end date/time:        Delivery (Maternal) (Provider to Complete) ([id number])       Blood Loss  Mother: Flores Ambreen SORENSEN #[id number]   Start of Mother's Information    Delivery Blood Loss  10/19/22 0827 - 10/19/22 1018    Total Surgical QBL Blood Loss (mL) Hospital Encounter 258 mL    Total  258 mL         End of Mother's Information  Mother: Ambreen Flores #[id number]                 Toney Polanco MD  "

## 2022-10-19 NOTE — OP NOTE
Section Operative Note  [facility]    Pre-operative diagnosis: Intrauterine pregnancy at 39 2/7 weeks gestation  Repeat  section  Breech presentation  Desires sterilization.    Post-operative diagnosis: Same   Procedure: Repeat low transverse  section  Bilateral salpingectomy   Surgeon: Toney Polanco MD   Assistant(s): Leslie Owen NP (assistance required for retraction, exposure, instrument handling, and wound closure)     Anesthesia: Spinal anesthesia and Tap block   Estimated blood loss: 500ml   Total IV fluids: (See anesthesia record)   Blood transfusion: No transfusion was given during surgery   Total urine output: (See anesthesia record)   Drains: Stout catheter   Specimens: placenta    Findings: Vigorous  female ([place]).  Apgar's 9/9, intact placenta with 3VC, nml pelvic anatomy   Complications: None   Condition: Mother stable, transfered to post-anesthesia recovery     Procedure Details:  The risks, benefits, complications, treatment options, and expected outcomes were discussed with the patient.  The patient concurred with the proposed plan, giving informed consent.  The site of surgery properly noted/marked. The patient was taken to Operating Room,  identity confirmed and the procedure verified as  Delivery. A Time Out was held and the above information confirmed.    After uneventful anesthesia placement the patient was prepped and draped in the left lateral position and a stout catheter placed.   A low-transverse skin incision was made with a scalpel and carried down through the subcutaneous tissue to the fascia. Fascial incision was made and extended transversely. The fascia was  from the underlying rectus tissue superiorly and inferiorly. The peritoneum was identified and entered without difficulty. The utero-vesical peritoneal reflection was incised transversely and the bladder flap was bluntly freed from the lower uterine segment. A low  transverse uterine incision was made. The infant was delivered from the sadie breech presentation with usual maeuvers.  After the umbilical cord was clamped and cut the infant was suctioned and handed to the awaiting resuscitation team.  Cord blood was obtained for evaluation. The placenta was removed intact and the uterus swept free of membranes and debris. The uterine incision was closed with running locked sutures of 1.0 Chromic in a 2 layer fashion.  Hemostasis was excellent. Bilateral salpingectomy  was performed by excision of the fallopian tubes at the uterine cornua/mesosaplyx with the Ligasure bipolar cutting cautery device.  Irrigation with warm normal saline was carried out until clear. The fascia was then reapproximated with running sutures of 0 PDS. The subcutaneous space was irrigated and checked for hemostasis.  The skin was reapproximated with running 3.0 Monocryl and covered with surgical glue.   A vaginal exam was performed at the end of the procedure to evacuate the lower uterine segment and vagina of clots.  There were no complications and the patient was transferred to the recovery room in excellent and stable condition.    Instrument, sponge, and needle counts were correct prior the abdominal closure and at the conclusion of the case.         Toney Polanco MD

## 2022-10-19 NOTE — ANESTHESIA CARE TRANSFER NOTE
Patient: Ambreen Flores    Procedure: Procedure(s):   SECTION, WITH BILATERAL SALPINGECTOMY viable baby girl at 0836       Diagnosis: Previous  section [Z98.891]  Encounter for female sterilization procedure [Z30.2]  Diagnosis Additional Information: No value filed.    Anesthesia Type:   Spinal     Note:    Oropharynx: oropharynx clear of all foreign objects and spontaneously breathing  Level of Consciousness: awake  Oxygen Supplementation: room air    Independent Airway: airway patency satisfactory and stable  Dentition: dentition unchanged  Vital Signs Stable: post-procedure vital signs reviewed and stable  Report to RN Given: handoff report given  Patient transferred to: Labor and Delivery    Handoff Report: Identifed the Patient, Identified the Reponsible Provider, Reviewed the pertinent medical history, Discussed the surgical course, Reviewed Intra-OP anesthesia mangement and issues during anesthesia, Set expectations for post-procedure period and Allowed opportunity for questions and acknowledgement of understanding      Vitals:  Vitals Value Taken Time   /66 10/19/22 0940   Temp     Pulse 87 10/19/22 0943   Resp 29 10/19/22 0943   SpO2 95 % 10/19/22 0943   Vitals shown include unvalidated device data.    Electronically Signed By: JOLYNN Hancock CRNA  2022  9:44 AM

## 2022-10-20 LAB
HGB BLD-MCNC: 12.4 G/DL (ref 11.7–15.7)
T PALLIDUM AB SER QL: NONREACTIVE

## 2022-10-20 PROCEDURE — 120N000001 HC R&B MED SURG/OB

## 2022-10-20 PROCEDURE — 250N000013 HC RX MED GY IP 250 OP 250 PS 637: Performed by: OBSTETRICS & GYNECOLOGY

## 2022-10-20 PROCEDURE — 250N000011 HC RX IP 250 OP 636: Performed by: OBSTETRICS & GYNECOLOGY

## 2022-10-20 PROCEDURE — 85018 HEMOGLOBIN: CPT | Performed by: OBSTETRICS & GYNECOLOGY

## 2022-10-20 PROCEDURE — 36415 COLL VENOUS BLD VENIPUNCTURE: CPT | Performed by: OBSTETRICS & GYNECOLOGY

## 2022-10-20 RX ADMIN — OXYCODONE HYDROCHLORIDE 5 MG: 5 TABLET ORAL at 08:40

## 2022-10-20 RX ADMIN — PRENATAL VITAMINS-IRON FUMARATE 27 MG IRON-FOLIC ACID 0.8 MG TABLET 1 TABLET: at 08:35

## 2022-10-20 RX ADMIN — ACETAMINOPHEN 975 MG: 325 TABLET, FILM COATED ORAL at 17:15

## 2022-10-20 RX ADMIN — ACETAMINOPHEN 975 MG: 325 TABLET, FILM COATED ORAL at 04:30

## 2022-10-20 RX ADMIN — IBUPROFEN 800 MG: 800 TABLET, FILM COATED ORAL at 20:49

## 2022-10-20 RX ADMIN — ACETAMINOPHEN 975 MG: 325 TABLET, FILM COATED ORAL at 23:05

## 2022-10-20 RX ADMIN — SENNOSIDES AND DOCUSATE SODIUM 2 TABLET: 50; 8.6 TABLET ORAL at 08:34

## 2022-10-20 RX ADMIN — SIMETHICONE 80 MG: 80 TABLET, CHEWABLE ORAL at 08:35

## 2022-10-20 RX ADMIN — ACETAMINOPHEN 975 MG: 325 TABLET, FILM COATED ORAL at 11:14

## 2022-10-20 RX ADMIN — IBUPROFEN 800 MG: 800 TABLET, FILM COATED ORAL at 15:04

## 2022-10-20 RX ADMIN — SENNOSIDES AND DOCUSATE SODIUM 2 TABLET: 50; 8.6 TABLET ORAL at 20:49

## 2022-10-20 RX ADMIN — KETOROLAC TROMETHAMINE 30 MG: 30 INJECTION, SOLUTION INTRAMUSCULAR; INTRAVENOUS at 04:30

## 2022-10-20 ASSESSMENT — ACTIVITIES OF DAILY LIVING (ADL)
ADLS_ACUITY_SCORE: 18

## 2022-10-20 NOTE — PLAN OF CARE
Assessments as charted. B/P: 123/66, T: 97.8, P: 80, R: 16. Rates pain: 3/10 uterine cramping. Incision: Healing well. Voiding without difficulty. Fundus Firm 3below U. Lochia: Light. Activity: unrestricted with out pain. Infant feeding: Breast feeding going well.           Postpartum breastfeeding assessment completed and education provided, see Patient Education Activity.  Items included in the education are:   proper positioning and latch  effectiveness of feeding  manual expression  handling and storing breastmilk  maintenance of breastfeeding for the first 6 months  sign/symptoms of infant feeding issues requiring referral to qualified health care provider  Postpartum care education provided, see Patient Education activity. Patient denies needs. Will monitor.  NORMA SINGLETON RN

## 2022-10-20 NOTE — PLAN OF CARE
Assessments as charted. B/P: 111/62, T: 98.5, P: 80, R: 20. Rates pain: 2/10 cramping uterine. Incision: Healing well. Voiding without difficulty. Fundus Firm 3 below U. Lochia: Light. Activity: unrestricted with out pain. Infant feeding: Breast feeding going well.           Postpartum breastfeeding assessment completed and education provided, see Patient Education Activity.  Items included in the education are:   proper positioning and latch  effectiveness of feeding  manual expression  handling and storing breastmilk  maintenance of breastfeeding for the first 6 months  sign/symptoms of infant feeding issues requiring referral to qualified health care provider  Postpartum care education provided, see Patient Education activity. Patient denies needs. Will monitor.  NORMA SINGLETON RN

## 2022-10-20 NOTE — PROGRESS NOTES
October 20, 2022  DAILY NOTE - POSTOP DAY 1    SUBJECTIVE: No complaints    Pain controlled? Yes  Tolerating a regular diet? YES  Ambulating? YES  Voiding without difficulty? Yes  Lochia? minimal  Breastfeeding:  yes        OBJECTIVE:  Vitals:    10/20/22 0600 10/20/22 0602 10/20/22 0753 10/20/22 0900   BP: 130/66  122/72 111/62   BP Location:       Patient Position:       Cuff Size:       Pulse: 87  95 80   Resp: 16  16 20   Temp:   98.5  F (36.9  C)    TempSrc:   Oral    SpO2: 98% 96% 96% 92%       Constitutional: healthy, alert and no distress    Abdomen:  Uterine fundus is firm, non-tender and at the level of the umbilicus   Incision: C/D/I     Extremeties:  neg edema, non-tender      LABS:  Hemoglobin   Date Value Ref Range Status   10/20/2022 12.4 11.7 - 15.7 g/dL Final   10/19/2022 12.0 11.7 - 15.7 g/dL Final   03/30/2021 11.6 (L) 11.7 - 15.7 g/dL Final   03/29/2021 13.5 11.7 - 15.7 g/dL Final     No results found for: RUBELLAABIGG   Lab Results   Component Value Date    ABO O 03/29/2021           Lab Results   Component Value Date    RH Pos 03/29/2021          ASSESSMENT:   PPD #1  Repeat LTCS  Tubal ligation    Doing well.  Clean wound without signs of infection.  No immediate surgical complications identified.  No excessive bleeding  Pain well-controlled.       PLAN:      Continue routine care  Ambulation encouraged      Anticipate discharge tomorrow    Toney Polanco MD

## 2022-10-20 NOTE — CARE PLAN
Face to face report given with opportunity to observe patient.    Report given to Danelle HEREDIA.    Janae Hogue RN   10/20/2022  7:09 AM

## 2022-10-20 NOTE — DISCHARGE INSTRUCTIONS
Postpartum appt. with Dr. Pratt 22 at 3:30pm      No heavy lifting greater than 20lb  for 6 weeks  No driving 2 weeks or while on pain meds  Pelvic rest for 4 weeks  No vigorous activity, exercise, swim, bath for 4 weeks  Schedule Postop check Dr. Polanco 2 weeks.  Lactation f/u 2-4 days prn.  PP exam Dr. Pratt 6 weeks.    Call Dr. Polanco [phone] as necessary if problems in interim     Vaginal Delivery or  Birth   Discharge Instructions    Activity: Go back to your normal activities, except ***    Diet: You may eat a regular diet. Drink plenty of fluids.      Call your Doctor  if you have any of these symptoms:    * You soak a sanitary pad with blood within 1 hour, or you see clots larger than a  golf ball.    * Bleeding that lasts more than 6 weeks.     *Bad-smelling fluid that comes out of your vagina.    *A fever above 100.4 degrees Fahrenheit (38.4 degrees Celsius) with or without chills.    * Severe pain, cramping, or tenderness in your lower belly area.    * Increased pain, swelling, redness or fluid around your stitches.    * A need to urinate more frequently (use the toilet more often), more urgently (use the toilet very quickly), or it burns when you urinate.    * Redness, swelling, or pain around a vein in your leg.    * Problems coping with sadness, anxiety, or depression.    * Problems breastfeeding, or a red or painful area on your breast.    * You have questions or concerns after you return home.      [facility] [employer]: [phone]

## 2022-10-21 VITALS
OXYGEN SATURATION: 98 % | SYSTOLIC BLOOD PRESSURE: 114 MMHG | HEART RATE: 89 BPM | DIASTOLIC BLOOD PRESSURE: 70 MMHG | TEMPERATURE: 97.1 F | RESPIRATION RATE: 16 BRPM

## 2022-10-21 PROCEDURE — 250N000013 HC RX MED GY IP 250 OP 250 PS 637: Performed by: OBSTETRICS & GYNECOLOGY

## 2022-10-21 RX ORDER — IBUPROFEN 800 MG/1
800 TABLET, FILM COATED ORAL EVERY 6 HOURS PRN
Qty: 100 TABLET | Refills: 0 | Status: SHIPPED | OUTPATIENT
Start: 2022-10-21 | End: 2022-11-08

## 2022-10-21 RX ORDER — OXYCODONE HYDROCHLORIDE 5 MG/1
5 TABLET ORAL EVERY 4 HOURS PRN
Qty: 20 TABLET | Refills: 0 | Status: SHIPPED | OUTPATIENT
Start: 2022-10-21 | End: 2022-11-08

## 2022-10-21 RX ORDER — ACETAMINOPHEN 325 MG/1
650 TABLET ORAL EVERY 4 HOURS PRN
Qty: 100 TABLET | Refills: 0 | Status: SHIPPED | OUTPATIENT
Start: 2022-10-21 | End: 2022-11-08

## 2022-10-21 RX ORDER — AMOXICILLIN 250 MG
1 CAPSULE ORAL 2 TIMES DAILY
Qty: 60 TABLET | Refills: 0 | Status: SHIPPED | OUTPATIENT
Start: 2022-10-21

## 2022-10-21 RX ADMIN — ACETAMINOPHEN 975 MG: 325 TABLET, FILM COATED ORAL at 05:33

## 2022-10-21 RX ADMIN — SENNOSIDES AND DOCUSATE SODIUM 1 TABLET: 50; 8.6 TABLET ORAL at 09:31

## 2022-10-21 RX ADMIN — PRENATAL VITAMINS-IRON FUMARATE 27 MG IRON-FOLIC ACID 0.8 MG TABLET 1 TABLET: at 09:32

## 2022-10-21 RX ADMIN — IBUPROFEN 800 MG: 800 TABLET, FILM COATED ORAL at 05:33

## 2022-10-21 ASSESSMENT — ACTIVITIES OF DAILY LIVING (ADL)
ADLS_ACUITY_SCORE: 18

## 2022-10-21 NOTE — PROGRESS NOTES
Name: Ambreen Flores  Age: 26 year old  YOB: 1995   Medical Record #: [id number]     Postpartum Day 2:     DATE: 10/21/2022  SUBJECTIVE:  Patient is doing well. Her pain is well controlled with current medications. She reports normal lochia. She is tolerating a regular diet without nausea. She is ambulating and voiding spontaneously. She is passing flatus. The patient denies depression symptoms, headache, vision changes, fever or chills, shortness of breath, or chest pain. The baby is well. She is nursing.  She desires discharge.    OBJECTIVE:  /59 (BP Location: Left arm, Patient Position: Semi-Aguilera's, Cuff Size: Adult Regular)   Pulse 80   Temp 97.9  F (36.6  C) (Oral)   Resp 17   LMP 2022 (Exact Date)   SpO2 95%     No intake/output data recorded.    Well developed and well nourished female in no apparent distress. Alert and oriented. Lungs are clear to auscultation bilaterally. Heart is regular rate and rhythm. Abdomen is nondistended with positive bowel sounds. Uterine fundus is firm and palpated below the umbilicus. Incision is clean, dry, and intact without erythema or exudate. Perineum is clean, dry and intact. Extremities no edema, non-tender.    LABS :  Lab Results   Component Value Date    ABO O 2021    RH Pos 2021    AS Negative 10/19/2022    TREPT Nonreactive 10/19/2022    HGB 12.4 10/20/2022    HCT 35.0 10/19/2022     10/19/2022     COVID-19 Virus PCR to [facility] - Result   Date Value Ref Range Status   2021 Not Detected  Final     Comment:     Collection of multiple specimens from the same patient may be necessary to   detect the virus. The possibility of a false negative should be considered if   the patient's recent exposure or clinical presentation suggests 2019 nCOV   infection and diagnostic tests for other causes of illness are negative.   Repeat testing may be considered in this setting.  Patient sample was heat inactivated and  amplified using the HDPCR SARS-CoV-2   assay ([employer] The HDPCRTM SARS-CoV-2 assay is a reverse   transcription real-time polymerase chain reaction (qRT-PCR) test intended for   the qualitative detection of nucleic acid  from SARS-CoV-2 in human nasopharyngeal swabs, oropharyngeal swabs, anterior   nasal swabs, mid-turbinate nasal swabs as well as nasal aspirate, nasal wash,   and bronchoalveolar lavage (BAL) specimens from individuals who are suspected   of COVID-19 by their healthcare provider.  A negative result does not rule out the presence of real-time PCR inhibitors   in the specimen or COVID-19 RNA in concentrations below the limit of detection   of the assay. The possibility of a false negative should be considered if the   patients recent exposure or clinical presentation suggests COVID-19.   Additional testing or repeat testing requires consultation with the   laboratory.  Nasopharyngeal specimen is the preferred choice for swab-based SARS CoV2   testing. When collection of a nasopharyngeal swab is not possible the   following are acceptable alternatives:  an oropharyngeal (OP) specimen collected by a healthcare professional, or a   nasal mid-turbinate (NMT) swab collected by a healthcare professional or by   onsite self-collection (using a flocked tapered swab), or an anterior nares   specimen collected by a healthcare professional or by onsite self-collection   (using a round foam swab). (Centers for Disease Control)  Testing performed by [facility] ([facility]) [address] [place] [address] [address] [place] [id number]  The test performance characteristics were determined by [facility]. It has not been   cleared or approved by the FDA.  The laboratory is regulated under the Clinical Laboratory Improvement   Amendments of 1988 (CLIA-88) as qualified to perform high-complexity testing.   This test is used for clinical purposes. It should not be regarded  as   investigational or for research.       SARS CoV2 PCR   Date Value Ref Range Status   2022 Positive (A) Negative Final     Comment:     POSITIVE: SARS-CoV-2 (COVID-19) RNA detected, presumed positive.       IMPRESSION :  1. 26 year old  female at 39w4d, now delivered.  2. POD# 2 s/p Repeat  section, bilateral salpingectomy, doing well.  3. Nursing    PLAN:  1. Continue routine post-partum and post-operative care.  2. Encourage nursing.  3. Encourage ambulation.   4. Reviewed some of the home instructions in anticipation of discharge; these include being aware of warning signs of postpartum depression, symptoms of infection, avoiding constipation, iron replacement, and the need to seek medical evaluation for any questions or concerns.  5. I discussed incision and wound care recommendations and bathing recommendations.  6. Repeat Serology / Treponema Pallidum Antibody Test obtained per [facility] protocol.  7. Anticipate discharge on POD # 2 in stable condition.    Alejandro Ibrahim MD  Obstetrics and Gynecology

## 2022-10-21 NOTE — PLAN OF CARE
Goal Outcome Evaluation:      Plan of Care Reviewed With: patient, significant other    Overall Patient Progress: improvingOverall Patient Progress: improving       In to see pt. Just finished feeding babe. States gets an occassional cramp but is having no pain. Scheduled tylenol and ibuprofen given at this time. Pt declines needs. Significant other present. Babe rooming in.

## 2022-10-21 NOTE — PLAN OF CARE
Goal Outcome Evaluation:      Plan of Care Reviewed With: patient, significant other    Overall Patient Progress: improvingOverall Patient Progress: improving  Assessments as charted. B/P: 113/59, T: 97.9, P: 80, R: 17. Rates pain: 0/10 . Incision: Healing well, well approximated, without signs of infection, and no drainage. Voiding without difficulty. Fundus firm. Lochia: Light. Activity: unrestricted with out pain. Infant feeding: Breast feeding going well. Babe rooming in. Declines needs          Postpartum breastfeeding assessment completed and education provided, see Patient Education Activity.  Items included in the education are:   proper positioning and latch  effectiveness of feeding  manual expression  handling and storing breastmilk  maintenance of breastfeeding for the first 6 months  sign/symptoms of infant feeding issues requiring referral to qualified health care provider  Postpartum care education provided, see Patient Education activity. Patient denies needs. Will monitor.  Ranjana Mendoza RN

## 2022-10-21 NOTE — PLAN OF CARE
Goal Outcome Evaluation:      Plan of Care Reviewed With: patient, significant other    Overall Patient Progress: improvingOverall Patient Progress: improving         Face to face report given with opportunity to observe patient.    Report given to Bonita Mendoza RN   10/21/2022  7:22 AM

## 2022-10-21 NOTE — PLAN OF CARE
VSS and afebrile. Assessments completed as charted. Fundus firm U/3 and lochia scant amt, with no large clots noted per pt. Incision clean, dry and intact with liquid glue in place. Incision open to air with abdominal binder in place. Pt reports passing flatus. Pt up ind in room. Encouraged to ambulate hallways x2 a shift. Pt reports pain 0 (No pain) in incision. States relief noted from Tylenol and Ibuprofen. Babe rooming in and bonding well. Deny any needs or concerns at this time. Will continue to monitor.

## 2022-10-21 NOTE — CARE PLAN
Face to face report given with opportunity to observe patient.    Report given to YAN Munoz RN   10/20/2022  7:30 PM

## 2022-10-21 NOTE — DISCHARGE SUMMARY
OB  BIRTH DISCHARGE SUMMARY    Name: Ambreen Flores  Age: 26 year old  YOB: 1995   Medical Record #: [id number]     Date of Admission:  10/19/2022  Date of Discharge:  10/21/2022  Admitting Physician:  Toney Polanco MD  Discharge Physician:  Alejandro Ibrahim MD  Discharging Service:  Obstetrics and Gynecology     Primary Provider:   Shanna Montague         Admission Diagnoses:     Previous  section [Z98.891]  Encounter for female sterilization procedure [Z30.2]   delivery delivered [O82]          Discharge Diagnosis:     1. 26 year old  female at 39w4d, now delivered.  2. POD# 2 s/p Repeat  section, bilateral salpingectomy, doing well.  3. Nursing          Discharge Disposition:     Discharged to home.           Condition on Discharge:     Discharge condition: Stable   Discharge vitals: /59 (BP Location: Left arm, Patient Position: Semi-Aguilera's, Cuff Size: Adult Regular)   Pulse 80   Temp 97.9  F (36.6  C) (Oral)   Resp 17   LMP 2022 (Exact Date)   SpO2 95%    Code status on discharge: Full Code          Brief History of Illness:     Ambreen Flores is a 26 year old female who was admitted for  section. Please see her admit H&P for full details of her PMH, PSH, Meds, Allergies and exam on admission.          Procedure Performed:     Repeat Low Segment Transverse  Section  Bilateral salpingectomy  Spinal analgesia         Infant Delivery Information:     39w4d at time of delivery.  Delivery Date: 10/19/2022  Sex: Female  Weight: 3705 gm  APGAR 1 min: 9  APGAR 5 min: 9         Hospital Course:     Ambreen Flores is a 26 year old female who was admitted to the hospital for the above listed indications. She has a history of a prior  section and presented for elective repeat  section delivery. Please see her  Section Operative Note for full details regarding her delivery. The patient's hospital course was  unremarkable. The Art catheter was removed on POD# 1 and her diet was advanced. She recovered as anticipated and her postoperative course was uncomplicated. On POD# 2, she was meeting all of her postpartum goals and deemed stable for discharge. She was ambulating well, voiding without difficulty, tolerating a regular diet without nausea and vomiting, her pain was well controlled on oral pain medicines and her lochia was appropriate. No fever or significant wound drainage. Breastfeeding well. Infant is stable. She will be discharged home in good condition on POD# 2. Her hemoglobin is 12.4. Her Blood type and Rh status is O Positive, and Rhogam was not indicated.          Post-Partum Complications:     None.         Contraception:     The patient has bilateral salpingectomy for sterilization.         Medications Prior to Admission:     Medications Prior to Admission   Medication Sig Dispense Refill Last Dose     cetirizine (ZYRTEC) 10 MG tablet Take 10 mg by mouth   Past Month     Prenatal Vit-Fe Fumarate-FA (PRENATAL MULTIVITAMIN W/IRON) 27-0.8 MG tablet Take 1 tablet by mouth daily   10/18/2022             Discharge Medications:     Current Discharge Medication List      START taking these medications    Details   acetaminophen (TYLENOL) 325 MG tablet Take 2 tablets (650 mg) by mouth every 4 hours as needed for mild pain, fever or headaches  Qty: 100 tablet, Refills: 0    Associated Diagnoses:  delivery delivered      ibuprofen (ADVIL/MOTRIN) 800 MG tablet Take 1 tablet (800 mg) by mouth every 6 hours as needed for moderate pain  Qty: 100 tablet, Refills: 0    Associated Diagnoses:  delivery delivered      oxyCODONE (ROXICODONE) 5 MG tablet Take 1 tablet (5 mg) by mouth every 4 hours as needed for moderate to severe pain  Qty: 20 tablet, Refills: 0    Associated Diagnoses:  delivery delivered      senna-docusate (SENOKOT-S/PERICOLACE) 8.6-50 MG tablet Take 1 tablet by mouth 2 times  daily  Qty: 60 tablet, Refills: 0    Associated Diagnoses:  delivery delivered         CONTINUE these medications which have NOT CHANGED    Details   cetirizine (ZYRTEC) 10 MG tablet Take 10 mg by mouth      Prenatal Vit-Fe Fumarate-FA (PRENATAL MULTIVITAMIN W/IRON) 27-0.8 MG tablet Take 1 tablet by mouth daily                   Consultations:     No consultations were requested during this admission          Significant Results:     None.             Pending Results:      Pathology results are pending.           Discharge Instructions and Follow-Up:     Discharge Diet: Regular   Discharge Activity: Increase activity as tolerated. No vigorous activity or exercise for 6 weeks.    No swimming or bath for 7-10 days.    No driving or operating machinery for 2 weeks or while on narcotics.    Lifting restrictions up to 15 pounds for 6 weeks.    Pelvic rest for 6 weeks which includes intercourse, douching and tampons.   Discharge Nursing: Nursing is encouraged.    Schedule outpatient lactation follow up in 2-4 days.   Discharge Instructions: The warning signs of postpartum depression have been reviewed, and the patient is aware that this can be a common occurrence. She is encouraged to seek immediate medical evaluation and assistance for any questions or concerns.    Instructions on avoiding constipation and straining are discussed. The patient is advised in the appropriate use of stool softeners to avoid constipation.    The patient will call the Ob/Gyn Clinic Nurse at [phone] for problems such as fever (temperature >100.4), chills, pain not controlled by usual oral pain medications, persistent nausea and vomiting, constipation, difficulty nursing, heavy odorous vaginal discharge, burning or pain associated with urination.  Call for excessive vaginal bleeding, saturating more than one pad an hour for more than three consecutive hours.  Call for any problems with the incision, drainage or redness from incision  site or increasing pain.   Discharge Wound care: Routine incision care is discussed with the patient.     The patient is instructed to keep her incisions clean and dry by running soapy water over them and dabbing them dry. It is best to shower for the first week while the healing process is underway; after 7-10 days it is safe to take a bath.     The incision was closed with Steri-Strips which may fall off on their own. If they do not, then they may be removed after 10-14 days.   Discharge Follow-up: Follow up for outpatient lactation appointment in 2-4 days if desired.    Follow up for postpartum exam and incision check in 1-2 weeks with Dr. Polanco or Leslie Owen NP.    Follow up for postpartum exam in 6 weeks with Dr. Polanco or Dr. Pratt.    Follow up with Primary Care Provider as scheduled for management of active medical problems and for adult preventive services.    Call Dr. Ibrahim at the OB/GYN Clinic at [phone] as necessary if you have problems in the interim.     Total time spent for discharge on date of discharge: 40 minutes  I saw the patient on the date of discharge.    Alejandro Ibrahim MD  Obstetrics and Gynecology

## 2022-10-21 NOTE — PLAN OF CARE
Able to visualize and assess an entire feeding session. Mom positions and latches babe well ind. Once latched good sucking/swallowing noted. Latch score 10/10. Education done with mom on nursing positions, how to obtain a deep latch, how to tell babe is latched well, how to properly unlatch babe, how to tell babe is getting enough, how often to nurse babe, feeding cues, burping techniques, nipple confusion, pacifier use, hand expression and milk storage. States understanding and denies any questions or concerns. Will continue to monitor.

## 2022-10-24 LAB
PATH REPORT.COMMENTS IMP SPEC: NORMAL
PATH REPORT.COMMENTS IMP SPEC: NORMAL
PATH REPORT.FINAL DX SPEC: NORMAL
PATH REPORT.GROSS SPEC: NORMAL
PATH REPORT.MICROSCOPIC SPEC OTHER STN: NORMAL
PATH REPORT.RELEVANT HX SPEC: NORMAL
PHOTO IMAGE: NORMAL

## 2022-10-24 PROCEDURE — 88302 TISSUE EXAM BY PATHOLOGIST: CPT | Mod: 26 | Performed by: PATHOLOGY

## 2022-11-08 ENCOUNTER — PRENATAL OFFICE VISIT (OUTPATIENT)
Dept: OBGYN | Facility: OTHER | Age: 27
End: 2022-11-08
Attending: OBSTETRICS & GYNECOLOGY
Payer: MEDICAID

## 2022-11-08 VITALS
HEART RATE: 113 BPM | OXYGEN SATURATION: 98 % | SYSTOLIC BLOOD PRESSURE: 118 MMHG | BODY MASS INDEX: 26.52 KG/M2 | DIASTOLIC BLOOD PRESSURE: 68 MMHG | HEIGHT: 66 IN | WEIGHT: 165 LBS

## 2022-11-08 DIAGNOSIS — Z98.890 POST-OPERATIVE STATE: ICD-10-CM

## 2022-11-08 PROCEDURE — 99024 POSTOP FOLLOW-UP VISIT: CPT | Performed by: OBSTETRICS & GYNECOLOGY

## 2022-11-08 PROCEDURE — G0463 HOSPITAL OUTPT CLINIC VISIT: HCPCS

## 2022-11-08 ASSESSMENT — PAIN SCALES - GENERAL: PAINLEVEL: NO PAIN (0)

## 2022-11-08 NOTE — NURSING NOTE
"Chief Complaint   Patient presents with     Postpartum Care     3wk pp/ c/s on 10/19/22 Evelio Mao       Initial /68 (BP Location: Left arm, Cuff Size: Adult Regular)   Pulse 113   Ht 1.676 m (5' 6\")   Wt 74.8 kg (165 lb)   LMP 01/19/2022 (Exact Date)   SpO2 98%   BMI 26.63 kg/m   Estimated body mass index is 26.63 kg/m  as calculated from the following:    Height as of this encounter: 1.676 m (5' 6\").    Weight as of this encounter: 74.8 kg (165 lb).  Medication Reconciliation: complete  Olga Bae LPN  "

## 2022-11-09 NOTE — PROGRESS NOTES
"SUBJECTIVE:  PO check  Ambreen Flores is a 26 year old female P2 here for a postpartum visit.  She had a  Section   delivering a healthy baby girl  Currently no complaints and doing well.    Today's Depression Rating was 5    delivery complications:  No  breast feeding:  Yes  bladder problems:  No  bowel problems/hemorrhoids:  No  Incisional problems:  No  vaginal flow:  scant  [place]:  Yes  contraception:  abstinence  emotional adjustment:  doing well and happy      OBJECTIVE:  Blood pressure 118/68, pulse 113, height 1.676 m (5' 6\"), weight 74.8 kg (165 lb), last menstrual period 2022, SpO2 98 %, unknown if currently breastfeeding.   General - pleasant female in no acute distress.  Abdomen - soft, nontender, nondistended, no hepatosplenomegaly.  Incision:  Clean, dry, intact.  No erythema or drainage.    Rectovaginal - deferred.    ASSESSMENT:  normal postpartum exam.  Released from pregnancy related restrictions    PLAN:  Continue lifting/activity restrictions until 6 wk PP    The patient will use Salpingectomy  for birth control.   Return to clinic for pp exam 6 weeks with Dr. Pratt   Pt has my card and phone number to call as needed if problems in the interim.     Toney Polanco MD  "

## 2023-05-13 NOTE — PLAN OF CARE
Patient discharged at 10:15 AM via ambulation accompanied by significant other and staff. Prescriptions sent to patients preferred pharmacy. All belongings sent with patient.     Discharge instructions reviewed with pt and fiance. Listed belongings gathered and returned to patient. AVS reviewed and signed. Id bands matched and paperwork completed.    Patient discharged to home.     Pneumonia and Influenza given prior to discharge, if indicated: N/A    Surgical Patient   Surgical Procedures during stay: yes  Did patient receive discharge instruction on wound care and recognition of infection symptoms? Yes    MISC  Follow up appointment made:  Yes  Home and hospital aquired medications returned to patient: N/A  Patient reports pain was well managed at discharge: Yes    24

## 2023-06-04 ENCOUNTER — HEALTH MAINTENANCE LETTER (OUTPATIENT)
Age: 28
End: 2023-06-04

## 2024-07-14 ENCOUNTER — HEALTH MAINTENANCE LETTER (OUTPATIENT)
Age: 29
End: 2024-07-14

## 2024-08-18 ENCOUNTER — HOSPITAL ENCOUNTER (EMERGENCY)
Facility: HOSPITAL | Age: 29
Discharge: HOME | End: 2024-08-18
Attending: NURSE PRACTITIONER | Admitting: NURSE PRACTITIONER

## 2024-08-18 VITALS
DIASTOLIC BLOOD PRESSURE: 81 MMHG | HEART RATE: 97 BPM | OXYGEN SATURATION: 98 % | TEMPERATURE: 98.4 F | SYSTOLIC BLOOD PRESSURE: 138 MMHG | RESPIRATION RATE: 18 BRPM

## 2024-08-18 DIAGNOSIS — J02.9 ACUTE PHARYNGITIS, UNSPECIFIED ETIOLOGY: ICD-10-CM

## 2024-08-18 DIAGNOSIS — J02.9 ACUTE PHARYNGITIS: Primary | ICD-10-CM

## 2024-08-18 LAB — GROUP A STREP BY PCR: NOT DETECTED

## 2024-08-18 PROCEDURE — 99213 OFFICE O/P EST LOW 20 MIN: CPT | Performed by: NURSE PRACTITIONER

## 2024-08-18 PROCEDURE — G0463 HOSPITAL OUTPT CLINIC VISIT: HCPCS

## 2024-08-18 PROCEDURE — 87651 STREP A DNA AMP PROBE: CPT | Performed by: NURSE PRACTITIONER

## 2024-08-18 ASSESSMENT — COLUMBIA-SUICIDE SEVERITY RATING SCALE - C-SSRS
1. IN THE PAST MONTH, HAVE YOU WISHED YOU WERE DEAD OR WISHED YOU COULD GO TO SLEEP AND NOT WAKE UP?: NO
6. HAVE YOU EVER DONE ANYTHING, STARTED TO DO ANYTHING, OR PREPARED TO DO ANYTHING TO END YOUR LIFE?: NO
2. HAVE YOU ACTUALLY HAD ANY THOUGHTS OF KILLING YOURSELF IN THE PAST MONTH?: NO

## 2024-08-18 ASSESSMENT — ENCOUNTER SYMPTOMS
RHINORRHEA: 0
CHILLS: 0
FEVER: 0
COUGH: 0
SORE THROAT: 1
SHORTNESS OF BREATH: 0

## 2024-08-18 ASSESSMENT — ACTIVITIES OF DAILY LIVING (ADL): ADLS_ACUITY_SCORE: 35

## 2024-08-18 NOTE — ED PROVIDER NOTES
History     Chief Complaint   Patient presents with    Pharyngitis     HPI  Amberen Flores is a 28 year old female who presents to urgent care for evaluation of a sore throat that started yesterday and worsened today.  No fever.  Does report chills today.  Painful to swallow.  No rhinorrhea, nasal congestion, PND, cough or shortness of breath.  She states that her boyfriend and his kids have been sick.  She has been taking DayQuil/NyQuil for her symptoms.    Allergies:  Allergies   Allergen Reactions    Seasonal Allergies        Problem List:    Patient Active Problem List    Diagnosis Date Noted     delivery delivered 10/19/2022     Priority: Medium     Repeat CD, salpingectomy Collin 10/19/22  [facility]/baby doc  Breech  Girl HARJEET        Encounter for triage in pregnant patient 2022     Priority: Medium    Engagement of fetus in breech position 2021     Priority: Medium     Added automatically from request for surgery [id number]      Single liveborn infant, delivered by --3/29/2021 2021     Priority: Medium        Past Medical History:    Past Medical History:   Diagnosis Date    Fetal alcohol syndrome        Past Surgical History:    Past Surgical History:   Procedure Laterality Date    APPENDECTOMY N/A 2017    estimated date     SECTION N/A 3/29/2021    Procedure:  SECTION;  Surgeon: Frow, David Franke, MD;  Location: [facility]    COMBINED  SECTION, SALPINGECTOMY BILATERAL N/A 10/19/2022    Procedure:  SECTION, WITH BILATERAL SALPINGECTOMY viable baby girl at 0836;  Surgeon: Toney Polanco MD;  Location: [facility]       Family History:    Family History   Adopted: Yes   Family history unknown: Yes       Social History:  Marital Status:   [4]  Social History     Tobacco Use    Smoking status: Never    Smokeless tobacco: Never   Substance Use Topics    Alcohol use: Not Currently    Drug use: Never        Medications:    cetirizine (ZYRTEC) 10 MG  tablet  Prenatal Vit-Fe Fumarate-FA (PRENATAL MULTIVITAMIN W/IRON) 27-0.8 MG tablet  senna-docusate (SENOKOT-S/PERICOLACE) 8.6-50 MG tablet          Review of Systems   Constitutional:  Negative for chills and fever.   HENT:  Positive for sore throat. Negative for congestion, ear pain, postnasal drip and rhinorrhea.    Respiratory:  Negative for cough and shortness of breath.    All other systems reviewed and are negative.      Physical Exam   BP: 138/81  Pulse: 97  Temp: 98.4  F (36.9  C)  Resp: 18  SpO2: 98 %      Physical Exam  Vitals and nursing note reviewed.   Constitutional:       Appearance: She is well-developed. She is not ill-appearing or toxic-appearing.   HENT:      Head: Atraumatic.      Right Ear: Tympanic membrane and ear canal normal.      Left Ear: Tympanic membrane and ear canal normal.      Mouth/Throat:      Mouth: Mucous membranes are moist.      Pharynx: Uvula midline. No pharyngeal swelling, oropharyngeal exudate, posterior oropharyngeal erythema or uvula swelling.      Tonsils: No tonsillar exudate or tonsillar abscesses. 0 on the right. 1+ on the left.   Cardiovascular:      Rate and Rhythm: Normal rate and regular rhythm.      Heart sounds: Normal heart sounds.   Pulmonary:      Effort: Pulmonary effort is normal. No respiratory distress.      Breath sounds: Normal breath sounds.   Musculoskeletal:      Cervical back: Neck supple.   Lymphadenopathy:      Cervical: No cervical adenopathy.   Skin:     General: Skin is warm and dry.      Coloration: Skin is not pale.   Neurological:      Mental Status: She is alert and oriented to person, place, and time.         ED Course        Procedures         Results for orders placed or performed during the hospital encounter of 08/18/24 (from the past 24 hour(s))   Group A Streptococcus PCR Throat Swab    Specimen: Throat; Swab   Result Value Ref Range    Group A strep by PCR Not Detected Not Detected    Narrative    The Xpert Xpress Strep A test,  performed on the [employer], is a rapid, qualitative in vitro diagnostic test for the detection of Streptococcus pyogenes (Group A ß-hemolytic Streptococcus, Strep A) in throat swab specimens from patients with signs and symptoms of pharyngitis. The Xpert Xpress Strep A test can be used as an aid in the diagnosis of Group A Streptococcal pharyngitis. The assay is not intended to monitor treatment for Group A Streptococcus infections. The Xpert Xpress Strep A test utilizes an automated real-time polymerase chain reaction (PCR) to detect Streptococcus pyogenes DNA.       Medications - No data to display    Assessments & Plan (with Medical Decision Making)   28-year-old female with sore throat that started yesterday.  Afebrile.  Uvula is midline.  No erythema to posterior pharynx.  Mild left-sided tonsillar hypertrophy.  She tested negative for strep throat.  Likely viral etiology.  I discussed this with patient and recommended:  -Tylenol or ibuprofen as needed for pain  -Warm salt water gargles  -Push fluids  -Follow-up with primary doctor as needed.  -Return to urgent care or emergency department for any worsening or concerning symptoms.    I have reviewed the nursing notes.    I have reviewed the findings, diagnosis, plan and need for follow up with the patient.  This document was prepared using a combination of typing and voice generated software.  While every attempt was made for accuracy, spelling and grammatical errors may exist.         New Prescriptions    No medications on file       Final diagnoses:   Acute pharyngitis       8/18/2024   [facility]       Mpofu, Prudence, CNP  08/18/24 1915

## 2024-08-19 ENCOUNTER — HOSPITAL ENCOUNTER (EMERGENCY)
Facility: HOSPITAL | Age: 29
Discharge: HOME | End: 2024-08-19

## 2024-08-19 VITALS
RESPIRATION RATE: 18 BRPM | TEMPERATURE: 98.4 F | HEART RATE: 102 BPM | OXYGEN SATURATION: 97 % | SYSTOLIC BLOOD PRESSURE: 138 MMHG | DIASTOLIC BLOOD PRESSURE: 89 MMHG

## 2024-08-19 DIAGNOSIS — J02.9 PHARYNGITIS: ICD-10-CM

## 2024-08-19 DIAGNOSIS — J02.9 PHARYNGITIS, UNSPECIFIED ETIOLOGY: Primary | ICD-10-CM

## 2024-08-19 LAB
GROUP A STREP BY PCR: NOT DETECTED
SARS-COV-2 RNA RESP QL NAA+PROBE: NEGATIVE

## 2024-08-19 PROCEDURE — 87651 STREP A DNA AMP PROBE: CPT

## 2024-08-19 PROCEDURE — 87635 SARS-COV-2 COVID-19 AMP PRB: CPT

## 2024-08-19 PROCEDURE — G0463 HOSPITAL OUTPT CLINIC VISIT: HCPCS

## 2024-08-19 PROCEDURE — 99213 OFFICE O/P EST LOW 20 MIN: CPT

## 2024-08-19 PROCEDURE — 250N000009 HC RX 250

## 2024-08-19 RX ORDER — DEXAMETHASONE SODIUM PHOSPHATE 10 MG/ML
10 INJECTION INTRAMUSCULAR; INTRAVENOUS ONCE
Status: COMPLETED | OUTPATIENT
Start: 2024-08-19 | End: 2024-08-19

## 2024-08-19 RX ADMIN — DEXAMETHASONE SODIUM PHOSPHATE 10 MG: 10 INJECTION INTRAMUSCULAR; INTRAVENOUS at 10:32

## 2024-08-19 ASSESSMENT — COLUMBIA-SUICIDE SEVERITY RATING SCALE - C-SSRS
6. HAVE YOU EVER DONE ANYTHING, STARTED TO DO ANYTHING, OR PREPARED TO DO ANYTHING TO END YOUR LIFE?: NO
2. HAVE YOU ACTUALLY HAD ANY THOUGHTS OF KILLING YOURSELF IN THE PAST MONTH?: NO
1. IN THE PAST MONTH, HAVE YOU WISHED YOU WERE DEAD OR WISHED YOU COULD GO TO SLEEP AND NOT WAKE UP?: NO

## 2024-08-19 ASSESSMENT — ENCOUNTER SYMPTOMS
APPETITE CHANGE: 1
ACTIVITY CHANGE: 0
DIARRHEA: 0
NAUSEA: 0
CHILLS: 1
COUGH: 0
FEVER: 0
RHINORRHEA: 0
SORE THROAT: 1
VOMITING: 0

## 2024-08-19 NOTE — ED TRIAGE NOTES
Pt presents today with c/o sore throat and bruising to neck. States she was seen last night and worse today. Gagging anytime she swallows.

## 2024-08-19 NOTE — ED PROVIDER NOTES
History     Chief Complaint   Patient presents with    Pharyngitis     HPI  Ambreen Flores is a 28 year old female who presents to the urgent care with a 2 day history of a worsening sore throat. Was seen in the UC yesterday with negative strep testing. She denies fevers, n/v/d, and cough. Has had some chills. Family recently ill with sore throats-all feeling better. She is also concerned about some discoloration to neck that she noticed today. Denies injuries to neck. No recent abx. Ibuprofen PTA.     Allergies:  Allergies   Allergen Reactions    Seasonal Allergies        Problem List:    Patient Active Problem List    Diagnosis Date Noted     delivery delivered 10/19/2022     Priority: Medium     Repeat CD, salpingectomy Collin 10/19/22  [facility]  Breech  Girl HARJEET        Encounter for triage in pregnant patient 2022     Priority: Medium    Engagement of fetus in breech position 2021     Priority: Medium     Added automatically from request for surgery [id number]      Single liveborn infant, delivered by --3/29/2021 2021     Priority: Medium        Past Medical History:    Past Medical History:   Diagnosis Date    Fetal alcohol syndrome        Past Surgical History:    Past Surgical History:   Procedure Laterality Date    APPENDECTOMY N/A     estimated date     SECTION N/A 3/29/2021    Procedure:  SECTION;  Surgeon: Frow, David Franke, MD;  Location: [facility]    COMBINED  SECTION, SALPINGECTOMY BILATERAL N/A 10/19/2022    Procedure:  SECTION, WITH BILATERAL SALPINGECTOMY viable baby girl at 0836;  Surgeon: Toney Polanco MD;  Location: [facility]       Family History:    Family History   Adopted: Yes   Family history unknown: Yes       Social History:  Marital Status:   [4]  Social History     Tobacco Use    Smoking status: Never    Smokeless tobacco: Never   Substance Use Topics    Alcohol use: Not Currently    Drug use: Never         Medications:    cetirizine (ZYRTEC) 10 MG tablet  Prenatal Vit-Fe Fumarate-FA (PRENATAL MULTIVITAMIN W/IRON) 27-0.8 MG tablet  senna-docusate (SENOKOT-S/PERICOLACE) 8.6-50 MG tablet          Review of Systems   Constitutional:  Positive for appetite change and chills. Negative for activity change and fever.   HENT:  Positive for sore throat. Negative for congestion and rhinorrhea.    Respiratory:  Negative for cough.    Gastrointestinal:  Negative for diarrhea, nausea and vomiting.   All other systems reviewed and are negative.      Physical Exam   BP: 138/89  Pulse: 102  Temp: 98.4  F (36.9  C)  Resp: 18  SpO2: 97 %      Physical Exam  Vitals and nursing note reviewed.   Constitutional:       General: She is not in acute distress.     Appearance: She is well-developed. She is not ill-appearing or toxic-appearing.   HENT:      Right Ear: Tympanic membrane is not erythematous.      Left Ear: Tympanic membrane is not erythematous.      Nose: No congestion or rhinorrhea.      Mouth/Throat:      Pharynx: Uvula midline. Posterior oropharyngeal erythema present. No pharyngeal swelling.      Tonsils: No tonsillar exudate or tonsillar abscesses. 1+ on the right. 2+ on the left.   Cardiovascular:      Rate and Rhythm: Normal rate and regular rhythm.      Heart sounds: No murmur heard.  Pulmonary:      Effort: Pulmonary effort is normal.      Breath sounds: Normal breath sounds. No wheezing, rhonchi or rales.   Lymphadenopathy:      Cervical: Cervical adenopathy present.   Skin:     General: Skin is warm and dry.   Neurological:      Mental Status: She is alert.         ED Course        Procedures      Results for orders placed or performed during the hospital encounter of 08/18/24 (from the past 24 hour(s))   Group A Streptococcus PCR Throat Swab    Specimen: Throat; Swab   Result Value Ref Range    Group A strep by PCR Not Detected Not Detected    Narrative    The Xpert Xpress Strep A test, performed on the [employer], is a rapid, qualitative in vitro diagnostic test for the detection of Streptococcus pyogenes (Group A ß-hemolytic Streptococcus, Strep A) in throat swab specimens from patients with signs and symptoms of pharyngitis. The Xpert Xpress Strep A test can be used as an aid in the diagnosis of Group A Streptococcal pharyngitis. The assay is not intended to monitor treatment for Group A Streptococcus infections. The Xpert Xpress Strep A test utilizes an automated real-time polymerase chain reaction (PCR) to detect Streptococcus pyogenes DNA.       Medications   dexAMETHasone (DECADRON) injectable solution used ORALLY 10 mg (10 mg Oral $Given 8/19/24 1032)       Assessments & Plan (with Medical Decision Making)     I have reviewed the nursing notes.    I have reviewed the findings, diagnosis, plan and need for follow up with the patient.  Ambreen Flores is a 28 year old female who presents to the urgent care with a 2 day history of a worsening sore throat. Was seen in the  yesterday with negative strep testing. She denies fevers, n/v/d, and cough. Has had some chills. Family recently ill with sore throats-all feeling better. She is also concerned about some discoloration to neck that she noticed today. Denies injuries to neck. No recent abx. Ibuprofen PTA.     MDM: vital signs normal, afebrile. Non toxic in appearance with no noted distress. Lungs clear, heart tones regular. Left tonsils 2+ with visible tonsil stone, no purulent drainage from stone. Right tonsils 1+. Uvula midline. No trismus, drooling, or visible peritonsillar abscess. Able to remove discoloration form neck with alcohol wipe, most likely makeup. She would like a repeat strep. Strep and covid pending. Decadron given in . Supportive measures and return precautions discussed. She is in agreement with plan.     (J02.9) Pharyngitis  Plan: We will notify you with your results.   Tylenol and ibuprofen as directed.   Cool liquids, honey,  and salt water gargles to sooth throat.   Return with any new or concerning symptoms. Follow up in the clinic for a recheck. Understanding verbalized.       New Prescriptions    No medications on file       Final diagnoses:   Pharyngitis       8/19/2024   [facility]       Erika Martel NP  08/19/24 1040

## 2024-08-19 NOTE — DISCHARGE INSTRUCTIONS
You tested negative for strep throat.  Sore throat is likely due to a viral illness.  Tylenol or ibuprofen as needed for pain.  Do warm salt water gargles.  Drink plenty of fluids.    Follow-up with your doctor as needed.  Return to urgent care or emergency department for any worsening or concerning symptoms.

## 2024-08-19 NOTE — DISCHARGE INSTRUCTIONS
We will notify you with your results.   Tylenol and ibuprofen as directed.   Cool liquids, honey, and salt water gargles to sooth throat.   Return with any new or concerning symptoms. Follow up in the clinic for a recheck.

## 2025-05-17 ENCOUNTER — HEALTH MAINTENANCE LETTER (OUTPATIENT)
Age: 30
End: 2025-05-17

## (undated) DEVICE — IRRIGATION-H2O 1000ML

## (undated) DEVICE — PACK-BASIN SET-UP

## (undated) DEVICE — LIGHT HANDLE COVER

## (undated) DEVICE — SUTURE-CHROMIC GUT 1 CTX 905H

## (undated) DEVICE — SCD SLEEVE-KNEE REG.

## (undated) DEVICE — LABEL-STERILE PREPRINTED FOR OR

## (undated) DEVICE — IRRIGATION-NACL 1000ML

## (undated) DEVICE — SUTURE-PDS II 0 CTX Z370T

## (undated) DEVICE — FLUID TRAP FOR VIROSAFE FILTERS

## (undated) DEVICE — TAPE-MEDIPORE 4" X 2YD

## (undated) DEVICE — CATH TRAY-16FR METER W/STATLOCK LATEX]

## (undated) DEVICE — APPLICATOR-CHLORAPREP 26ML TINTED CHG 2%+ 70% IPA-SURGICAL

## (undated) DEVICE — CAUTERY PAD-POLYHESIVE II ADULT

## (undated) DEVICE — LIGASURE-IMPACT SEALER/DIVIDER

## (undated) DEVICE — LIGHT HANDLE COVER FOR SKYTRON LIGHTS

## (undated) DEVICE — SYRINGE-ASEPTO IRRIGATION

## (undated) DEVICE — GLV-8.5 BIOGEL LATEX

## (undated) DEVICE — Device

## (undated) DEVICE — PACK-C-SECTION-CUSTOM

## (undated) DEVICE — CANISTER-SUCTION 2000CC

## (undated) DEVICE — SUTURE-VICRYL 0 CT J358H

## (undated) DEVICE — GOWN-SURG XL LVL 3 REINFORCED

## (undated) DEVICE — STAPLER-SKIN 35 WIDE STAPLES

## (undated) DEVICE — DRSG-SPONGE STERILE 8 X 4

## (undated) RX ORDER — EPHEDRINE SULFATE 50 MG/ML
INJECTION, SOLUTION INTRAMUSCULAR; INTRAVENOUS; SUBCUTANEOUS
Status: DISPENSED
Start: 2022-10-19

## (undated) RX ORDER — FENTANYL CITRATE-0.9 % NACL/PF 10 MCG/ML
PLASTIC BAG, INJECTION (ML) INTRAVENOUS
Status: DISPENSED
Start: 2021-03-29

## (undated) RX ORDER — ONDANSETRON 2 MG/ML
INJECTION INTRAMUSCULAR; INTRAVENOUS
Status: DISPENSED
Start: 2021-03-29

## (undated) RX ORDER — FENTANYL CITRATE-0.9 % NACL/PF 10 MCG/ML
PLASTIC BAG, INJECTION (ML) INTRAVENOUS
Status: DISPENSED
Start: 2022-10-19

## (undated) RX ORDER — OXYTOCIN/0.9 % SODIUM CHLORIDE 30/500 ML
PLASTIC BAG, INJECTION (ML) INTRAVENOUS
Status: DISPENSED
Start: 2022-10-19

## (undated) RX ORDER — KETOROLAC TROMETHAMINE 30 MG/ML
INJECTION, SOLUTION INTRAMUSCULAR; INTRAVENOUS
Status: DISPENSED
Start: 2022-10-19

## (undated) RX ORDER — ONDANSETRON 2 MG/ML
INJECTION INTRAMUSCULAR; INTRAVENOUS
Status: DISPENSED
Start: 2022-10-19

## (undated) RX ORDER — HYDROMORPHONE HYDROCHLORIDE 1 MG/ML
INJECTION, SOLUTION INTRAMUSCULAR; INTRAVENOUS; SUBCUTANEOUS
Status: DISPENSED
Start: 2022-10-19